# Patient Record
Sex: MALE | Race: BLACK OR AFRICAN AMERICAN | NOT HISPANIC OR LATINO | Employment: FULL TIME | URBAN - METROPOLITAN AREA
[De-identification: names, ages, dates, MRNs, and addresses within clinical notes are randomized per-mention and may not be internally consistent; named-entity substitution may affect disease eponyms.]

---

## 2017-02-21 ENCOUNTER — OFFICE VISIT (OUTPATIENT)
Dept: FAMILY MEDICINE | Facility: CLINIC | Age: 37
End: 2017-02-21
Payer: COMMERCIAL

## 2017-02-21 VITALS
HEIGHT: 68 IN | DIASTOLIC BLOOD PRESSURE: 76 MMHG | BODY MASS INDEX: 38.26 KG/M2 | WEIGHT: 252.44 LBS | HEART RATE: 69 BPM | SYSTOLIC BLOOD PRESSURE: 114 MMHG | OXYGEN SATURATION: 98 %

## 2017-02-21 DIAGNOSIS — R53.83 FATIGUE, UNSPECIFIED TYPE: ICD-10-CM

## 2017-02-21 DIAGNOSIS — R00.2 PALPITATIONS: ICD-10-CM

## 2017-02-21 DIAGNOSIS — R07.9 CHEST PAIN, UNSPECIFIED TYPE: Primary | ICD-10-CM

## 2017-02-21 PROCEDURE — 99999 PR PBB SHADOW E&M-EST. PATIENT-LVL III: CPT | Mod: PBBFAC,,, | Performed by: INTERNAL MEDICINE

## 2017-02-21 PROCEDURE — 93010 ELECTROCARDIOGRAM REPORT: CPT | Mod: S$GLB,,, | Performed by: INTERNAL MEDICINE

## 2017-02-21 PROCEDURE — 1160F RVW MEDS BY RX/DR IN RCRD: CPT | Mod: S$GLB,,, | Performed by: INTERNAL MEDICINE

## 2017-02-21 PROCEDURE — 93005 ELECTROCARDIOGRAM TRACING: CPT | Mod: S$GLB,,, | Performed by: INTERNAL MEDICINE

## 2017-02-21 PROCEDURE — 99214 OFFICE O/P EST MOD 30 MIN: CPT | Mod: S$GLB,,, | Performed by: INTERNAL MEDICINE

## 2017-02-21 RX ORDER — MUPIROCIN 20 MG/G
OINTMENT TOPICAL
Refills: 1 | COMMUNITY
Start: 2016-12-28 | End: 2017-04-17

## 2017-02-21 RX ORDER — SULFAMETHOXAZOLE AND TRIMETHOPRIM 800; 160 MG/1; MG/1
TABLET ORAL
Refills: 0 | COMMUNITY
Start: 2016-12-28 | End: 2017-03-21

## 2017-02-21 RX ORDER — HYDROCODONE BITARTRATE AND ACETAMINOPHEN 7.5; 325 MG/1; MG/1
TABLET ORAL
Refills: 0 | COMMUNITY
Start: 2016-12-28 | End: 2017-04-17

## 2017-02-21 NOTE — MR AVS SNAPSHOT
[facility]  [address]  [place] [address]  Phone: [phone]  Fax: [phone]                  Stanley Bancroft Harvey Jr.   2017 4:10 PM   Office Visit    Description:  Male : 1980   Provider:  Devante Oliver MD   Department:  [facility]           Reason for Visit     Fatigue     Shortness of Breath           Diagnoses this Visit        Comments    Chest pain, unspecified type    -  Primary     Palpitations         Fatigue, unspecified type                To Do List           Future Appointments        Provider Department Dept Phone    3/14/2017 8:15 AM LAB, [facility] - Lab [phone]    3/21/2017 6:50 PM Devante Oliver MD [facility] [phone]      Goals (5 Years of Data)     None      Follow-Up and Disposition     Return in about 4 weeks (around 3/21/2017).    Follow-up and Disposition History      [facility]     [facility] Care [facility] -  Assistance  Registered nurses in the [facility] provide clinical advisement, health education, appointment booking, and other advisory services.  Call for this free service at [phone].             Medications                Verify that the below list of medications is an accurate representation of the medications you are currently taking.  If none reported, the list may be blank. If incorrect, please contact your healthcare provider. Carry this list with you in case of emergency.           Current Medications     predniSONE (DELTASONE) 10 MG tablet Take 4 tabs daily for 3 days then Take 3 tabs daily for 3 days thenTake 2 tabs daily for 3 days then Take 1 tab daily for 3 days then stop    sulfamethoxazole-trimethoprim 800-160mg (BACTRIM DS) 800-160 mg Tab TK 1 T PO  BID    hydrocodone-acetaminophen 7.5-325mg (NORCO) 7.5-325 mg per tablet 1 Q 6 H PRF BREAKTHRU PAIN ONLY    mupirocin (BACTROBAN) 2 % ointment APPLY BID FOR 7 TO 10 DAYS     "sumatriptan (IMITREX) 50 MG tablet Take 1 tablet (50 mg total) by mouth every 2 (two) hours as needed for Migraine. may repeat x1 after 2h if HA recurs. Take no more than 2 tabs in 24 hour period.    tizanidine (ZANAFLEX) 2 MG tablet 1-2 pills at night as needed for neck pain           Clinical Reference Information           Your Vitals Were     BP Pulse Height Weight SpO2 BMI    114/76 69 5' 8" (1.727 m) 114.5 kg (252 lb 6.8 oz) 98% 38.38 kg/m2      Blood Pressure          Most Recent Value    BP  114/76      Allergies as of 2/21/2017     No Known Allergies      Immunizations Administered on Date of Encounter - 2/21/2017     None      Orders Placed During Today's Visit     Future Labs/Procedures Expected by Expires    CBC auto differential  2/21/2017 4/22/2018    Comprehensive metabolic panel  2/21/2017 4/22/2018    EKG 12-lead  2/21/2017 4/22/2018    Lipid panel  2/21/2017 4/22/2018    Testosterone  2/21/2017 4/22/2018    TSH  2/21/2017 4/22/2018      Smoking Cessation     If you would like to quit smoking:   You may be eligible for free services if you are a [place] resident and started smoking cigarettes before September 1, 1988.  Call the Smoking Cessation Trust ([facility]) toll free at (557) 780-6158 or (594) 505-0452.   Call 1-800-QUIT-NOW if you do not meet the above criteria.            Language Assistance Services     ATTENTION: Language assistance services are available, free of charge. Please call [phone].      ATENCIÓN: Si habla español, tiene a nagy disposición servicios gratuitos de asistencia lingüística. Llame al [phone].     CHÚ Ý: N?u b?n nói Ti?ng Vi?t, có các d?ch v? h? tr? ngôn ng? mi?n phí dành cho b?n. G?i s? [phone].         [facility] complies with applicable Federal civil rights laws and does not discriminate on the basis of race, color, national origin, age, disability, or sex.        "

## 2017-02-21 NOTE — PROGRESS NOTES
Subjective:       Patient ID: Stanley Bancroft Harvey Jr. is a 36 y.o. male.    Chief Complaint: Fatigue and Shortness of Breath    HPI Comments: Complains of intermittent mild - moderate chest pain off and on for the past 1 year.  Seems to have gotten worse over the past 1 mo.  It's worse with exertion and sometimes associated with palpitations.  He was moving furniture with his father-in-law Saturday and was very SOB going down the stairs.  His older father-in-law was not in comparison.   He has had to take unusual breaks at work lately related to exertional fatigue.  His chest is not painful to touch.  No early FH CAD, but he smokes.  He feels tired all the time lately as well.  His palpitations can occur at rest or exertion.     Fatigue   Associated symptoms include chest pain and fatigue. Pertinent negatives include no abdominal pain, arthralgias, fever, joint swelling, nausea, rash or vomiting.   Shortness of Breath   Associated symptoms include chest pain. Pertinent negatives include no abdominal pain, fever, rash or vomiting.     Review of Systems   Constitutional: Positive for fatigue. Negative for appetite change and fever.   HENT: Negative for nosebleeds and trouble swallowing.    Eyes: Negative for discharge and visual disturbance.   Respiratory: Positive for chest tightness. Negative for choking and shortness of breath.    Cardiovascular: Positive for chest pain and palpitations.   Gastrointestinal: Negative for abdominal pain, nausea and vomiting.   Musculoskeletal: Negative for arthralgias and joint swelling.   Skin: Negative for rash and wound.   Neurological: Negative for dizziness and syncope.   Psychiatric/Behavioral: Negative for confusion and dysphoric mood.       Objective:      Vitals:    02/21/17 1609   BP: 114/76   Pulse: 69     Physical Exam   Constitutional: He appears well-nourished.   Eyes: Conjunctivae and EOM are normal.   Neck: Normal range of motion.   Cardiovascular: Normal rate and  "regular rhythm.    Pulmonary/Chest: Effort normal and breath sounds normal.   Musculoskeletal:   Normal ROM bilateral    Neurological: No cranial nerve deficit (grossly intact).   Skin: Skin is warm and dry.   Psychiatric: He has a normal mood and affect.   Alert and orientated   Vitals reviewed.        Assessment:       1. Chest pain, unspecified type    2. Palpitations    3. Fatigue, unspecified type        Plan:       Chest pain, unspecified type  -     CBC auto differential; Future; Expected date: 2/21/17  -     Comprehensive metabolic panel; Future; Expected date: 2/21/17  -     Lipid panel; Future; Expected date: 2/21/17  -     TSH; Future; Expected date: 2/21/17  -     Testosterone; Future; Expected date: 2/21/17    Palpitations  -     CBC auto differential; Future; Expected date: 2/21/17  -     Comprehensive metabolic panel; Future; Expected date: 2/21/17  -     Lipid panel; Future; Expected date: 2/21/17  -     TSH; Future; Expected date: 2/21/17  -     Testosterone; Future; Expected date: 2/21/17  -     EKG 12-lead; Future; Expected date: 2/21/17    Fatigue, unspecified type  -     CBC auto differential; Future; Expected date: 2/21/17  -     Comprehensive metabolic panel; Future; Expected date: 2/21/17  -     Lipid panel; Future; Expected date: 2/21/17  -     TSH; Future; Expected date: 2/21/17  -     Testosterone; Future; Expected date: 2/21/17    EKG - intraventricular conduction delay; bradycardia; read by me        Counseled on regular exercise, maintenance of a healthy weight, balanced diet rich in fruits/vegetables and lean protein, and avoidance of unhealthy habits like smoking and excessive alcohol intake.   Also, counseled on importance of being compliant with medication, health appointments, diet and exercise.     Return in about 4 weeks (around 3/21/2017).    "This note will not be shared with the patient."  "

## 2017-03-14 ENCOUNTER — LAB VISIT (OUTPATIENT)
Dept: LAB | Facility: HOSPITAL | Age: 37
End: 2017-03-14
Attending: INTERNAL MEDICINE
Payer: COMMERCIAL

## 2017-03-14 DIAGNOSIS — R00.2 PALPITATIONS: ICD-10-CM

## 2017-03-14 DIAGNOSIS — R53.83 FATIGUE, UNSPECIFIED TYPE: ICD-10-CM

## 2017-03-14 DIAGNOSIS — R07.9 CHEST PAIN, UNSPECIFIED TYPE: ICD-10-CM

## 2017-03-14 LAB
ALBUMIN SERPL BCP-MCNC: 3.4 G/DL
ALP SERPL-CCNC: 79 U/L
ALT SERPL W/O P-5'-P-CCNC: 25 U/L
ANION GAP SERPL CALC-SCNC: 10 MMOL/L
AST SERPL-CCNC: 24 U/L
BASOPHILS # BLD AUTO: 0.02 K/UL
BASOPHILS NFR BLD: 0.4 %
BILIRUB SERPL-MCNC: 0.4 MG/DL
BUN SERPL-MCNC: 9 MG/DL
CALCIUM SERPL-MCNC: 9 MG/DL
CHLORIDE SERPL-SCNC: 105 MMOL/L
CHOLEST/HDLC SERPL: 4.2 {RATIO}
CO2 SERPL-SCNC: 25 MMOL/L
CREAT SERPL-MCNC: 1 MG/DL
DIFFERENTIAL METHOD: ABNORMAL
EOSINOPHIL # BLD AUTO: 0.1 K/UL
EOSINOPHIL NFR BLD: 1.8 %
ERYTHROCYTE [DISTWIDTH] IN BLOOD BY AUTOMATED COUNT: 13 %
EST. GFR  (AFRICAN AMERICAN): >60 ML/MIN/1.73 M^2
EST. GFR  (NON AFRICAN AMERICAN): >60 ML/MIN/1.73 M^2
GLUCOSE SERPL-MCNC: 107 MG/DL
HCT VFR BLD AUTO: 41.1 %
HDL/CHOLESTEROL RATIO: 23.7 %
HDLC SERPL-MCNC: 139 MG/DL
HDLC SERPL-MCNC: 33 MG/DL
HGB BLD-MCNC: 13.7 G/DL
LDLC SERPL CALC-MCNC: 87.6 MG/DL
LYMPHOCYTES # BLD AUTO: 1.5 K/UL
LYMPHOCYTES NFR BLD: 32.4 %
MCH RBC QN AUTO: 29.6 PG
MCHC RBC AUTO-ENTMCNC: 33.3 %
MCV RBC AUTO: 89 FL
MONOCYTES # BLD AUTO: 0.5 K/UL
MONOCYTES NFR BLD: 10.7 %
NEUTROPHILS # BLD AUTO: 2.4 K/UL
NEUTROPHILS NFR BLD: 54.5 %
NONHDLC SERPL-MCNC: 106 MG/DL
PLATELET # BLD AUTO: 302 K/UL
PMV BLD AUTO: 10.1 FL
POTASSIUM SERPL-SCNC: 4.1 MMOL/L
PROT SERPL-MCNC: 6.8 G/DL
RBC # BLD AUTO: 4.63 M/UL
SODIUM SERPL-SCNC: 140 MMOL/L
TESTOST SERPL-MCNC: 453 NG/DL
TRIGL SERPL-MCNC: 92 MG/DL
TSH SERPL DL<=0.005 MIU/L-ACNC: 1.3 UIU/ML
WBC # BLD AUTO: 4.47 K/UL

## 2017-03-14 PROCEDURE — 36415 COLL VENOUS BLD VENIPUNCTURE: CPT | Mod: PO

## 2017-03-14 PROCEDURE — 84443 ASSAY THYROID STIM HORMONE: CPT

## 2017-03-14 PROCEDURE — 80061 LIPID PANEL: CPT

## 2017-03-14 PROCEDURE — 85025 COMPLETE CBC W/AUTO DIFF WBC: CPT

## 2017-03-14 PROCEDURE — 80053 COMPREHEN METABOLIC PANEL: CPT

## 2017-03-14 PROCEDURE — 84403 ASSAY OF TOTAL TESTOSTERONE: CPT

## 2017-03-21 ENCOUNTER — OFFICE VISIT (OUTPATIENT)
Dept: FAMILY MEDICINE | Facility: CLINIC | Age: 37
End: 2017-03-21
Payer: COMMERCIAL

## 2017-03-21 VITALS
WEIGHT: 254.19 LBS | HEIGHT: 68 IN | OXYGEN SATURATION: 98 % | SYSTOLIC BLOOD PRESSURE: 120 MMHG | HEART RATE: 86 BPM | BODY MASS INDEX: 38.52 KG/M2 | DIASTOLIC BLOOD PRESSURE: 80 MMHG | RESPIRATION RATE: 16 BRPM

## 2017-03-21 DIAGNOSIS — G47.30 SLEEP APNEA, UNSPECIFIED TYPE: ICD-10-CM

## 2017-03-21 DIAGNOSIS — R53.83 FATIGUE, UNSPECIFIED TYPE: Primary | ICD-10-CM

## 2017-03-21 DIAGNOSIS — Z31.41 FERTILITY TESTING: ICD-10-CM

## 2017-03-21 PROCEDURE — 99999 PR PBB SHADOW E&M-EST. PATIENT-LVL IV: CPT | Mod: PBBFAC,,, | Performed by: INTERNAL MEDICINE

## 2017-03-21 PROCEDURE — 99214 OFFICE O/P EST MOD 30 MIN: CPT | Mod: S$GLB,,, | Performed by: INTERNAL MEDICINE

## 2017-03-21 PROCEDURE — 1160F RVW MEDS BY RX/DR IN RCRD: CPT | Mod: S$GLB,,, | Performed by: INTERNAL MEDICINE

## 2017-03-21 NOTE — MR AVS SNAPSHOT
[facility]  [address]  [place] [address]  Phone: [phone]  Fax: [phone]                  Stanley Bancroft Harvey Jr.   3/21/2017 6:50 PM   Office Visit    Description:  Male : 1980   Provider:  Devante Oliver MD   Department:  [facility]           Reason for Visit     Fatigue           Diagnoses this Visit        Comments    Fatigue, unspecified type    -  Primary     Sleep apnea, unspecified type         Fertility testing                To Do List           Future Appointments        Provider Department Dept Phone    2017 8:30 AM TERRENCE Salinas MD [facility] Urology [phone]      Goals (5 Years of Data)     None      Follow-Up and Disposition     Return if symptoms worsen or fail to improve.    Follow-up and Disposition History      [facility]     [facility] Care Line -  Assistance  Registered nurses in the [facility] Center provide clinical advisement, health education, appointment booking, and other advisory services.  Call for this free service at [phone].             Medications           STOP taking these medications     predniSONE (DELTASONE) 10 MG tablet Take 4 tabs daily for 3 days then Take 3 tabs daily for 3 days thenTake 2 tabs daily for 3 days then Take 1 tab daily for 3 days then stop    sulfamethoxazole-trimethoprim 800-160mg (BACTRIM DS) 800-160 mg Tab TK 1 T PO  BID    sumatriptan (IMITREX) 50 MG tablet Take 1 tablet (50 mg total) by mouth every 2 (two) hours as needed for Migraine. may repeat x1 after 2h if HA recurs. Take no more than 2 tabs in 24 hour period.    tizanidine (ZANAFLEX) 2 MG tablet 1-2 pills at night as needed for neck pain           Verify that the below list of medications is an accurate representation of the medications you are currently taking.  If none reported, the list may be blank. If incorrect, please contact your healthcare provider. Carry this list with you in  "case of emergency.           Current Medications     mupirocin (BACTROBAN) 2 % ointment APPLY BID FOR 7 TO 10 DAYS    hydrocodone-acetaminophen 7.5-325mg (NORCO) 7.5-325 mg per tablet 1 Q 6 H PRF BREAKTHRU PAIN ONLY           Clinical Reference Information           Your Vitals Were     BP Pulse Resp Height Weight SpO2    120/80 86 16 5' 8" (1.727 m) 115.3 kg (254 lb 3.1 oz) 98%    BMI                38.65 kg/m2          Blood Pressure          Most Recent Value    BP  120/80      Allergies as of 3/21/2017     No Known Allergies      Immunizations Administered on Date of Encounter - 3/21/2017     None      Orders Placed During Today's Visit      Normal Orders This Visit    Ambulatory consult for Sleep Study     Ambulatory consult to Urology       Smoking Cessation     If you would like to quit smoking:   You may be eligible for free services if you are a [place] resident and started smoking cigarettes before September 1, 1988.  Call the Smoking Cessation Trust ([facility]) toll free at (112) 796-1697 or [phone]) 294-9213.   Call 1-800-QUIT-NOW if you do not meet the above criteria.            Language Assistance Services     ATTENTION: Language assistance services are available, free of charge. Please call [phone].      ATENCIÓN: Si habla español, tiene a nagy disposición servicios gratuitos de asistencia lingüística. Llame al [phone].     CHÚ Ý: N?u b?n nói Ti?ng Vi?t, có các d?ch v? h? tr? ngôn ng? mi?n phí dành cho b?n. G?i s? [phone].         [facility] complies with applicable Federal civil rights laws and does not discriminate on the basis of race, color, national origin, age, disability, or sex.        "

## 2017-03-22 NOTE — PROGRESS NOTES
Subjective:       Patient ID: Stanley Bancroft Harvey Jr. is a 36 y.o. male.    Chief Complaint: Fatigue    HPI Comments: Fatigue - still present.  Does not wake rested.  Scared will fall asleep while driving < 20 min.  Wife witnessed apnea episodes while sleeping.  Labs wnl.    Quit smoking and caffeine - very high amounts previously.  No more DELA CRUZ, palpitations or chest pain.   Interested in fertility testing.  Discussed sleep issues and fertility at length.    Fatigue   Associated symptoms include fatigue. Pertinent negatives include no abdominal pain, arthralgias, chest pain, fever, joint swelling, nausea, rash or vomiting.     Review of Systems   Constitutional: Positive for fatigue. Negative for appetite change and fever.   HENT: Negative for nosebleeds and trouble swallowing.    Eyes: Negative for discharge and visual disturbance.   Respiratory: Negative for choking and shortness of breath.    Cardiovascular: Negative for chest pain and palpitations.   Gastrointestinal: Negative for abdominal pain, nausea and vomiting.   Musculoskeletal: Negative for arthralgias and joint swelling.   Skin: Negative for rash and wound.   Neurological: Negative for dizziness and syncope.   Psychiatric/Behavioral: Negative for confusion and dysphoric mood.       Objective:      Vitals:    03/21/17 1840   BP: 120/80   Pulse: 86   Resp: 16     Physical Exam   Constitutional: He appears well-nourished.   Eyes: Conjunctivae and EOM are normal.   Neck: Normal range of motion.   Cardiovascular: Normal rate and regular rhythm.    Pulmonary/Chest: Effort normal and breath sounds normal.   Musculoskeletal:   Normal ROM bilateral    Neurological: No cranial nerve deficit (grossly intact).   Skin: Skin is warm and dry.   Psychiatric: He has a normal mood and affect.   Alert and orientated   Vitals reviewed.        Assessment:       1. Fatigue, unspecified type    2. Sleep apnea, unspecified type    3. Fertility testing        Plan:      "  Fatigue, unspecified type  -     Ambulatory consult for Sleep Study    Sleep apnea, unspecified type  -     Ambulatory consult for Sleep Study    Fertility testing  -     Ambulatory consult to Urology    Total time spent with patient was more than 25 minutes with more than half the time spent in direct consultation with the patient in regards to our treatment/plan.          Counseled on regular exercise, maintenance of a healthy weight, balanced diet rich in fruits/vegetables and lean protein, and avoidance of unhealthy habits like smoking and excessive alcohol intake.   Also, counseled on importance of being compliant with medication, health appointments, diet and exercise.     Return if symptoms worsen or fail to improve.    "This note will not be shared with the patient."  "

## 2017-04-17 ENCOUNTER — OFFICE VISIT (OUTPATIENT)
Dept: UROLOGY | Facility: CLINIC | Age: 37
End: 2017-04-17
Payer: COMMERCIAL

## 2017-04-17 VITALS
SYSTOLIC BLOOD PRESSURE: 138 MMHG | HEART RATE: 66 BPM | WEIGHT: 251.31 LBS | DIASTOLIC BLOOD PRESSURE: 76 MMHG | HEIGHT: 68 IN | BODY MASS INDEX: 38.09 KG/M2

## 2017-04-17 DIAGNOSIS — N46.9 MALE INFERTILITY: Primary | ICD-10-CM

## 2017-04-17 LAB
BILIRUB SERPL-MCNC: NORMAL MG/DL
BLOOD URINE, POC: NORMAL
COLOR, POC UA: YELLOW
GLUCOSE UR QL STRIP: NORMAL
KETONES UR QL STRIP: NORMAL
LEUKOCYTE ESTERASE URINE, POC: NORMAL
NITRITE, POC UA: NORMAL
PH, POC UA: 5
PROTEIN, POC: NORMAL
SPECIFIC GRAVITY, POC UA: 1.02
UROBILINOGEN, POC UA: NORMAL

## 2017-04-17 PROCEDURE — 1160F RVW MEDS BY RX/DR IN RCRD: CPT | Mod: S$GLB,,, | Performed by: UROLOGY

## 2017-04-17 PROCEDURE — 81002 URINALYSIS NONAUTO W/O SCOPE: CPT | Mod: S$GLB,,, | Performed by: UROLOGY

## 2017-04-17 PROCEDURE — 99999 PR PBB SHADOW E&M-EST. PATIENT-LVL III: CPT | Mod: PBBFAC,,, | Performed by: UROLOGY

## 2017-04-17 PROCEDURE — 99203 OFFICE O/P NEW LOW 30 MIN: CPT | Mod: 25,S$GLB,, | Performed by: UROLOGY

## 2017-04-17 NOTE — PROGRESS NOTES
Subjective:       Patient ID: Stanley Bancroft Harvey Jr. is a 36 y.o. male.    Chief Complaint: Low Testosterone    HPI     36 year old here for fertility evaluation.  He is  for the last 8 months but he and his wife have been trying to conceive for over a year.  His wife had been using birth control pills in the past.  No prevous marriages for either.  Wife age 30.  No children from other relationships.  He has no voiding complaints.  No previous urinary tract infections.  He is overall healthy.  Takes no medications on a regular basis.   No previous surgery.  He denies ED.    Urine dipstick shows negative for all components.    History reviewed. No pertinent past medical history.    Past Surgical History:   Procedure Laterality Date    CIRCUMCISION       No current outpatient prescriptions on file.      Review of Systems   Constitutional: Negative for fever.   Eyes: Negative for visual disturbance.   Respiratory: Negative for shortness of breath.    Cardiovascular: Negative for chest pain.   Gastrointestinal: Negative for nausea.   Genitourinary: Negative for dysuria and hematuria.   Musculoskeletal: Negative for gait problem.   Skin: Negative for rash.   Neurological: Negative for seizures.   Psychiatric/Behavioral: Negative for confusion.       Objective:      Physical Exam   Constitutional: He is oriented to person, place, and time. He appears well-developed and well-nourished.   HENT:   Head: Normocephalic and atraumatic.   Eyes: Conjunctivae are normal.   Cardiovascular: Normal rate.    Pulmonary/Chest: Effort normal.   Abdominal: Hernia confirmed negative in the right inguinal area and confirmed negative in the left inguinal area.   Genitourinary: Penis normal. Rectal exam shows no mass and anal tone normal. Prostate is enlarged (30g s/s/a). Prostate is not tender.   Genitourinary Comments: Testes are small, atrophic   Musculoskeletal: Normal range of motion.   Lymphadenopathy: No inguinal  adenopathy noted on the right or left side.   Neurological: He is alert and oriented to person, place, and time.   Skin: Skin is warm and dry. No rash noted.   Psychiatric: He has a normal mood and affect.   Vitals reviewed.      Assessment:       1. Male infertility        Plan:       Male infertility  -     POCT URINE DIPSTICK WITHOUT MICROSCOPE      I recommend semen analysis.

## 2017-04-17 NOTE — LETTER
April 17, 2017      Devante Oliver MD  [address] [place] [address]           [place] [facility]  [address] [place] [address]  Phone: [phone]          Patient: Stanley Bancroft Harvey Jr.   MR Number: [id number]   YOB: 1980   Date of Visit: 4/17/2017       Dear Dr. Devante Oliver:    Thank you for referring Nabil Mora to me for evaluation. Attached you will find relevant portions of my assessment and plan of care.    If you have questions, please do not hesitate to call me. I look forward to following Nabil Mora along with you.    Sincerely,    TERRENCE Salinas MD    Enclosure  CC:  No Recipients    If you would like to receive this communication electronically, please contact [email or web address] or [phone] to request more information on [facility] access.    For providers and/or their staff who would like to refer a patient to [facility], please contact us through our one-stop-shop provider referral line, [facility] , at [phone].    If you feel you have received this communication in error or would no longer like to receive these types of communications, please e-mail [email or web address]

## 2018-08-22 ENCOUNTER — HOSPITAL ENCOUNTER (OUTPATIENT)
Dept: RADIOLOGY | Facility: HOSPITAL | Age: 38
Discharge: HOME | End: 2018-08-22
Attending: FAMILY MEDICINE
Payer: COMMERCIAL

## 2018-08-22 ENCOUNTER — OFFICE VISIT (OUTPATIENT)
Dept: FAMILY MEDICINE | Facility: CLINIC | Age: 38
End: 2018-08-22
Payer: COMMERCIAL

## 2018-08-22 VITALS
BODY MASS INDEX: 40.53 KG/M2 | OXYGEN SATURATION: 97 % | HEART RATE: 86 BPM | DIASTOLIC BLOOD PRESSURE: 84 MMHG | WEIGHT: 267.44 LBS | HEIGHT: 68 IN | SYSTOLIC BLOOD PRESSURE: 132 MMHG

## 2018-08-22 DIAGNOSIS — M25.511 RIGHT SHOULDER PAIN, UNSPECIFIED CHRONICITY: ICD-10-CM

## 2018-08-22 DIAGNOSIS — M54.2 CERVICALGIA: ICD-10-CM

## 2018-08-22 DIAGNOSIS — M54.2 CERVICALGIA: Primary | ICD-10-CM

## 2018-08-22 DIAGNOSIS — M79.18 ABDOMINAL MUSCLE PAIN: ICD-10-CM

## 2018-08-22 PROCEDURE — 99214 OFFICE O/P EST MOD 30 MIN: CPT | Mod: S$GLB,,, | Performed by: FAMILY MEDICINE

## 2018-08-22 PROCEDURE — 72040 X-RAY EXAM NECK SPINE 2-3 VW: CPT | Mod: TC,FY,PO

## 2018-08-22 PROCEDURE — 72040 X-RAY EXAM NECK SPINE 2-3 VW: CPT | Mod: 26,,, | Performed by: RADIOLOGY

## 2018-08-22 PROCEDURE — 73030 X-RAY EXAM OF SHOULDER: CPT | Mod: 26,RT,, | Performed by: RADIOLOGY

## 2018-08-22 PROCEDURE — 73030 X-RAY EXAM OF SHOULDER: CPT | Mod: TC,FY,PO,RT

## 2018-08-22 PROCEDURE — 3008F BODY MASS INDEX DOCD: CPT | Mod: CPTII,S$GLB,, | Performed by: FAMILY MEDICINE

## 2018-08-22 PROCEDURE — 99999 PR PBB SHADOW E&M-EST. PATIENT-LVL IV: CPT | Mod: PBBFAC,,, | Performed by: FAMILY MEDICINE

## 2018-08-22 RX ORDER — NAPROXEN 500 MG/1
500 TABLET ORAL 2 TIMES DAILY
Qty: 60 TABLET | Refills: 2 | Status: SHIPPED | OUTPATIENT
Start: 2018-08-22 | End: 2018-10-24

## 2018-08-22 NOTE — PROGRESS NOTES
Subjective:       Patient ID: Stanley Bancroft Harvey Jr. is a 38 y.o. male.    Chief Complaint: Annual Exam    HPI     Here for a check up.    Reports that he got hurt at work 2 months ago.  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  Since then, any weight training activity using core muscles caused pain in right lower quad area.  Went to [facility] ER June 27, 2018 due to worsening right lower quad abdominal pain.  Had ct of abdomen which was normal.  Was told by Er doctor that he had possible torn abdominal muscle or pulled muscle.  Was referred to workman's comp doctor and seeing him currently.  Here for a 2nd opinion.      C/o right shoulder pain x  2 1/2 months. Went to urgent care approx 2 1/2 months ago and had normal shoulder xray.  Occasional sharp pain radiating from shoulder to right hand.  Pain while sleeping.     Also, reports occasional right sided neck pain x 2-3 months.     Review of Systems   Constitutional: Negative for fever.   Gastrointestinal: Positive for abdominal pain. Negative for constipation, diarrhea, nausea and vomiting.   Genitourinary: Negative for dysuria, frequency and hematuria.   Musculoskeletal: Negative for arthralgias and myalgias.   Neurological: Negative for headaches.       Objective:      Physical Exam   HENT:   Head: Atraumatic.   Eyes: Conjunctivae are normal. Pupils are equal, round, and reactive to light.   Neck: Normal range of motion.   Cardiovascular: Normal rate and regular rhythm.   No murmur heard.  Pulmonary/Chest: Effort normal and breath sounds normal. He has no wheezes.   Abdominal: Soft. Bowel sounds are normal. There is no tenderness. There is no guarding. No hernia.   No pain of right mid to lower quad with crunch   Musculoskeletal:   Right shoulder: tend of posterolateral area.  Pain with abduction > 90 deg. Positive bean.     Lymphadenopathy:     He has no cervical adenopathy.       Assessment:       1.  Cervicalgia    2. Right shoulder pain, unspecified chronicity    3. Abdominal muscle pain        Plan:       Cervicalgia  -     X-Ray Cervical Spine AP And Lateral; Future; Expected date: 08/22/2018  -     X-Ray Shoulder Trauma 3 view Right; Future; Expected date: 08/22/2018  -     Ambulatory Referral to Physical/Occupational Therapy   -start naproxen    Right shoulder pain, unspecified chronicity  -     X-Ray Cervical Spine AP And Lateral; Future; Expected date: 08/22/2018  -     X-Ray Shoulder Trauma 3 view Right; Future; Expected date: 08/22/2018  -     Ambulatory Referral to Physical/Occupational Therapy   -start naproxen    Abdominal muscle pain   -Abdominal muscle pain resolving. Cont seeing workman's comp    Other orders  -     naproxen (NAPROSYN) 500 MG tablet; Take 1 tablet (500 mg total) by mouth 2 (two) times daily.  Dispense: 60 tablet; Refill: 2

## 2018-08-29 NOTE — PROGRESS NOTES
inform pt via phone that I reviewed the test results and note the following:    Xray of neck was normal  -xray of right shoulder showed some arthritis. Keep appt with physical therapy.

## 2018-09-04 NOTE — PROGRESS NOTES
[facility]  Physical Therapy Initial Evaluation    Name: Stanley Bancroft Harvey Jr.  Clinic Number: [id number]    Therapy Diagnosis:   Encounter Diagnoses   Name Primary?    Chronic right shoulder pain     Neck stiffness      Physician: Vaibhav Funk MD    Physician Orders: PT Eval and Treat Neck and R shoulder   Medical Diagnosis: M54.2 (ICD-10-CM) - Cervicalgia  M25.511 (ICD-10-CM) - Right shoulder pain, unspecified chronicity  Evaluation Date: 9/5/2018  Authorization period Expiration: 8/22/2019  Plan of Care Certification Period: 10/18/2018    Visit #: 1/ Visits authorized: 1  Time In:  0508  Time Out: 0556  Total Billable Time: 48 minutes    Precautions: Standard    Subjective   Date of onset:  Chronic pain for 20 years  History of current condition - Hernesto reports that he has had chronic R shoulder pain since he was a teenager.  Patient reports an exacerbation of the symptoms following an injury suffered at the gym ~3 months ago involving a chest press machine.  Patient then noticed that he had decreased ROM and swelling in the R shoulder and clavicle and then ultimately saw the MD.  MD prescribed anti-inflammatories which he has not taken and has not taken any OTC medications either.         No past medical history on file.  Stanley Bancroft Harvey Jr.  has a past surgical history that includes Circumcision.    Nabil has a current medication list which includes the following prescription(s): naproxen.    Review of patient's allergies indicates:  No Known Allergies     Imaging, x-ray: cervical (unremarkable), shoulder (evidence of prior dislocation, bone spurs present)    Prior Therapy: none  Social History: Patient lives with their spouse in a 1 story home with no stairs to enter.    Occupation:  (currently not working secondary to torn abdominal muscle)  Prior Level of Function:  (I) in all ADLs prior to recent exacerbation of shoulder   Current Level of Function:   Avoidance of reaching/lifting overhead, difficulty with dressing/undressing upper body, difficulty reaching behind back, limitations in ability to reach all body parts while bathing with the R arm    Pain:  Current 2/10, worst 9/10, best 2/10   Location: neck  and shoulder  right  Description: Burning and tired   Aggravating Factors: Sitting, Lifting and reaching overhead, prolonged work overhead, reaching behind back, 1 sleep disturbance per week  Easing Factors: hot bath    Pts goals: Patient wants to get rid of the pain in his arm.        Objective     Observation: Unremarkable    Posture: Forward head, rounded shoulders    Cervical Range of Motion:    Degrees Pain   Flexion 30 no     Extension 64 no     Right Rotation 75 no   Left Rotation 60 no     Right Side Bending 46 yes   Left Side Bending 36  yes      Passive Range of Motion:   Shoulder Left Right   Flexion n/a 150 p!   Abduction 165 p! 150 p!   Extension N/a  n/a   ER at 90 n/a n/a   IR n/a n/a      Active Range of Motion:   Shoulder Left Right   Flexion  p!   Abduction 160 146 p!   Extension WFL WFL   ER at 90 WFL WFL   IR WFL WFL tightness   Functional IR T8 T8 p!   Functional ER C4 C4         Upper Extremity Strength  (R) UE  (L) UE    Shoulder flexion: 4/5  p! Shoulder flexion: 5/5   Shoulder Abduction: 4-/5  p! Shoulder abduction: 5/5   Shoulder ER 4-/5 p! Shoulder ER 5/5   Shoulder IR 4/5 Shoulder IR 5/5   Shoulder extension 4+/5 Elbow flexion: 5/5         Special Tests:  AC Joint Left Rigth   AC Joint Compression Test (-) (-)   Empty Can Test (-) (+) p!   Drop Arm test (-) (-)   Speed's test (-) (+) p!   Anterior Apprehension test (-) (-)         Joint Mobility: general GH joint stiffness noted in R shoulder        Sensation: Patient reports numbness and tingling in the R arm in the AM but none at this moment        CMS Impairment/Limitation/Restriction for FOTO Intake Survey    Therapist reviewed FOTO scores for Stanley Bancroft Harvey Jr.  on 9/5/2018.   FOTO documents entered into [employer] - see Media section.    Limitation Score: 49%  Category: Body Position    Current : CK = at least 40% but < 60% impaired, limited or restricted  Goal: CI = at least 1% but < 20% impaired, limited or restricted       PT Evaluation Completed? Yes  Discussed Plan of Care with patient: Yes    TREATMENT   Treatment Time In: 0508  Treatment Time Out: 0556  Total Treatment time separate from Evaluation time:16    Hernesto received therapeutic exercises to develop strength, endurance, ROM and flexibility for 16 minutes including:  Cane flexion 20 x   Cane abduction 20x  Bilateral shoulder extension BTB 3 x 10  Rows BTB 3 x 10      Home Exercises and Patient Education Provided    Education provided re:   - progress towards goals   - role of therapy in multi - disciplinary team, goals for therapy  No spiritual or educational barriers to learning provided    Written Home Exercises Provided: see scanned document in EMR.  Exercises were reviewed and Hernesto was able to demonstrate them prior to the end of the session.   Pt received a written copy of exercises to perform at home. Hernesto demonstrated good  understanding of the education provided.       Assessment     Nabil is a 38 y.o. male referred to outpatient Physical Therapy with a medical diagnosis of cervicalgia, right shoulder pain, unspecified chronicity. Pt presents with limited neck and R shoulder mobility as well as weakness of the RUE.  Functionally, Nabil has difficulty with reaching behind back, working overhead, and intermittently has difficulty with sleeping.    Pt prognosis is Good.   Pt will benefit from skilled outpatient Physical Therapy to address the deficits stated above and in the chart below, provide pt/family education, and to maximize pt's level of independence.     Plan of care discussed with patient: Yes  Pt's spiritual, cultural and educational needs considered and pt agreeable to plan of care and goals as  stated below:     Anticipated Barriers for therapy: none    Medical Necessity is demonstrated by the following  History  Co-morbidities and personal factors that may impact the plan of care Examination  Body Structures and Functions, activity limitations and participation restrictions that may impact the plan of care    Clinical Presentation   Co-morbidities:   none        Personal Factors:   no deficits Body Regions:   neck  upper extremities    Body Systems:    gross symmetry  ROM  strength  gross coordinated movement            Participation Restrictions:   none     Activity limitations:   Learning and applying knowledge  no deficits    General Tasks and Commands  no deficits    Communication  no deficits    Mobility  lifting and carrying objects    Self care  no deficits    Domestic Life  doing house work (cleaning house, washing dishes, laundry)    Interactions/Relationships  no deficits    Life Areas  employment    Community and Social Life  no deficits         stable and uncomplicated                      low   low  low Decision Making/ Complexity Score:  low       GOALS: Short Term Goals: 3 weeks  1.  Nabil will be independent in HEP.  2. Nabil will report 4/10 pain at worst with working overhead.  3. Nabil will demonstrate increased UE strength to 4/5 for increased ease with lifting/carrying.  4.  Nabil will demonstrate a 50% improvement in R shoulder motion to improved ability to reach overhead and behind his back.      Long Term Goals: 6 weeks  1.  Nabil will report 1-2/10 pain at worst with sitting.  2.Nabil will demonstrate increased UE strength to 5/5 for increased ease with work related activities.  3.  Nabil will demonstrate WFL R shoulder mobility to improve ability to reach behind back.  4.  Pt will report at CI level (1-20% impaired) on FOTO UE survey score for pain disability to demonstrate decrease in disability and improvement in UE pain.        Plan     Certification Period:  9/5/2018 to 10/18/2018.    Outpatient Physical Therapy 2 times weekly for 6 weeks to include the following interventions: Electrical Stimulation TENS, Gait Training, Manual Therapy, Moist Heat/ Ice, Neuromuscular Re-ed, Patient Education, Therapeutic Activites and Therapeutic Exercise.     Mai Estrada, PT, DPT

## 2018-09-05 ENCOUNTER — CLINICAL SUPPORT (OUTPATIENT)
Dept: REHABILITATION | Facility: HOSPITAL | Age: 38
End: 2018-09-05
Attending: FAMILY MEDICINE
Payer: COMMERCIAL

## 2018-09-05 DIAGNOSIS — M43.6 NECK STIFFNESS: ICD-10-CM

## 2018-09-05 DIAGNOSIS — G89.29 CHRONIC RIGHT SHOULDER PAIN: ICD-10-CM

## 2018-09-05 DIAGNOSIS — M25.511 CHRONIC RIGHT SHOULDER PAIN: ICD-10-CM

## 2018-09-05 PROCEDURE — 97110 THERAPEUTIC EXERCISES: CPT | Mod: PO

## 2018-09-05 PROCEDURE — 97161 PT EVAL LOW COMPLEX 20 MIN: CPT | Mod: PO

## 2018-09-06 ENCOUNTER — PATIENT MESSAGE (OUTPATIENT)
Dept: FAMILY MEDICINE | Facility: CLINIC | Age: 38
End: 2018-09-06

## 2018-09-06 PROBLEM — M43.6 NECK STIFFNESS: Status: ACTIVE | Noted: 2018-09-06

## 2018-09-06 PROBLEM — G89.29 CHRONIC RIGHT SHOULDER PAIN: Status: ACTIVE | Noted: 2018-09-06

## 2018-09-06 PROBLEM — M25.511 CHRONIC RIGHT SHOULDER PAIN: Status: ACTIVE | Noted: 2018-09-06

## 2018-09-06 NOTE — PLAN OF CARE
[facility]  Physical Therapy Initial Evaluation    Name: Stanley Bancroft Harvey Jr.  Clinic Number: [id number]    Therapy Diagnosis:   Encounter Diagnoses   Name Primary?    Chronic right shoulder pain     Neck stiffness      Physician: Vaibhav Funk MD    Physician Orders: PT Eval and Treat Neck and R shoulder   Medical Diagnosis: M54.2 (ICD-10-CM) - Cervicalgia  M25.511 (ICD-10-CM) - Right shoulder pain, unspecified chronicity  Evaluation Date: 9/5/2018  Authorization period Expiration: 8/22/2019  Plan of Care Certification Period: 10/18/2018    Visit #: 1/ Visits authorized: 1  Time In:  0508  Time Out: 0556  Total Billable Time: 48 minutes    Precautions: Standard    Subjective   Date of onset:  Chronic pain for 20 years  History of current condition - Hernesto reports that he has had chronic R shoulder pain since he was a teenager.  Patient reports an exacerbation of the symptoms following an injury suffered at the gym ~3 months ago involving a chest press machine.  Patient then noticed that he had decreased ROM and swelling in the R shoulder and clavicle and then ultimately saw the MD.  MD prescribed anti-inflammatories which he has not taken and has not taken any OTC medications either.         No past medical history on file.  Stanley Bancroft Harvey Jr.  has a past surgical history that includes Circumcision.    Nabil has a current medication list which includes the following prescription(s): naproxen.    Review of patient's allergies indicates:  No Known Allergies     Imaging, x-ray: cervical (unremarkable), shoulder (evidence of prior dislocation, bone spurs present)    Prior Therapy: none  Social History: Patient lives with their spouse in a 1 story home with no stairs to enter.    Occupation:  (currently not working secondary to torn abdominal muscle)  Prior Level of Function:  (I) in all ADLs prior to recent exacerbation of shoulder   Current Level of Function:   Avoidance of reaching/lifting overhead, difficulty with dressing/undressing upper body, difficulty reaching behind back, limitations in ability to reach all body parts while bathing with the R arm    Pain:  Current 2/10, worst 9/10, best 2/10   Location: neck  and shoulder  right  Description: Burning and tired   Aggravating Factors: Sitting, Lifting and reaching overhead, prolonged work overhead, reaching behind back, 1 sleep disturbance per week  Easing Factors: hot bath    Pts goals: Patient wants to get rid of the pain in his arm.        Objective     Observation: Unremarkable    Posture: Forward head, rounded shoulders    Cervical Range of Motion:    Degrees Pain   Flexion 30 no     Extension 64 no     Right Rotation 75 no   Left Rotation 60 no     Right Side Bending 46 yes   Left Side Bending 36  yes      Passive Range of Motion:   Shoulder Left Right   Flexion n/a 150 p!   Abduction 165 p! 150 p!   Extension N/a  n/a   ER at 90 n/a n/a   IR n/a n/a      Active Range of Motion:   Shoulder Left Right   Flexion  p!   Abduction 160 146 p!   Extension WFL WFL   ER at 90 WFL WFL   IR WFL WFL tightness   Functional IR T8 T8 p!   Functional ER C4 C4         Upper Extremity Strength  (R) UE  (L) UE    Shoulder flexion: 4/5  p! Shoulder flexion: 5/5   Shoulder Abduction: 4-/5  p! Shoulder abduction: 5/5   Shoulder ER 4-/5 p! Shoulder ER 5/5   Shoulder IR 4/5 Shoulder IR 5/5   Shoulder extension 4+/5 Elbow flexion: 5/5         Special Tests:  AC Joint Left Rigth   AC Joint Compression Test (-) (-)   Empty Can Test (-) (+) p!   Drop Arm test (-) (-)   Speed's test (-) (+) p!   Anterior Apprehension test (-) (-)         Joint Mobility: general GH joint stiffness noted in R shoulder        Sensation: Patient reports numbness and tingling in the R arm in the AM but none at this moment        CMS Impairment/Limitation/Restriction for FOTO Intake Survey    Therapist reviewed FOTO scores for Stanley Bancroft Harvey Jr.  on 9/5/2018.   FOTO documents entered into [email or web address] - see Media section.    Limitation Score: 49%  Category: Body Position    Current : CK = at least 40% but < 60% impaired, limited or restricted  Goal: CI = at least 1% but < 20% impaired, limited or restricted       PT Evaluation Completed? Yes  Discussed Plan of Care with patient: Yes    TREATMENT   Treatment Time In: 0508  Treatment Time Out: 0556  Total Treatment time separate from Evaluation time:16    Hernesto received therapeutic exercises to develop strength, endurance, ROM and flexibility for 16 minutes including:  Cane flexion 20 x   Cane abduction 20x  Bilateral shoulder extension BTB 3 x 10  Rows BTB 3 x 10      Home Exercises and Patient Education Provided    Education provided re:   - progress towards goals   - role of therapy in multi - disciplinary team, goals for therapy  No spiritual or educational barriers to learning provided    Written Home Exercises Provided: see scanned document in EMR.  Exercises were reviewed and Hernesto was able to demonstrate them prior to the end of the session.   Pt received a written copy of exercises to perform at home. Hernesto demonstrated good  understanding of the education provided.       Assessment     Nabil is a 38 y.o. male referred to outpatient Physical Therapy with a medical diagnosis of cervicalgia, right shoulder pain, unspecified chronicity. Pt presents with limited neck and R shoulder mobility as well as weakness of the RUE.  Functionally, Nabil has difficulty with reaching behind back, working overhead, and intermittently has difficulty with sleeping.    Pt prognosis is Good.   Pt will benefit from skilled outpatient Physical Therapy to address the deficits stated above and in the chart below, provide pt/family education, and to maximize pt's level of independence.     Plan of care discussed with patient: Yes  Pt's spiritual, cultural and educational needs considered and pt agreeable to plan of care and goals as  stated below:     Anticipated Barriers for therapy: none    Medical Necessity is demonstrated by the following  History  Co-morbidities and personal factors that may impact the plan of care Examination  Body Structures and Functions, activity limitations and participation restrictions that may impact the plan of care    Clinical Presentation   Co-morbidities:   none        Personal Factors:   no deficits Body Regions:   neck  upper extremities    Body Systems:    gross symmetry  ROM  strength  gross coordinated movement            Participation Restrictions:   none     Activity limitations:   Learning and applying knowledge  no deficits    General Tasks and Commands  no deficits    Communication  no deficits    Mobility  lifting and carrying objects    Self care  no deficits    Domestic Life  doing house work (cleaning house, washing dishes, laundry)    Interactions/Relationships  no deficits    Life Areas  employment    Community and Social Life  no deficits         stable and uncomplicated                      low   low  low Decision Making/ Complexity Score:  low       GOALS: Short Term Goals: 3 weeks  1.  Nabil will be independent in HEP.  2. Nabil will report 4/10 pain at worst with working overhead.  3. Nabil will demonstrate increased UE strength to 4/5 for increased ease with lifting/carrying.  4.  Nabil will demonstrate a 50% improvement in R shoulder motion to improved ability to reach overhead and behind his back.      Long Term Goals: 6 weeks  1.  Nabil will report 1-2/10 pain at worst with sitting.  2.Nabil will demonstrate increased UE strength to 5/5 for increased ease with work related activities.  3.  Nabil will demonstrate WFL R shoulder mobility to improve ability to reach behind back.  4.  Pt will report at CI level (1-20% impaired) on FOTO UE survey score for pain disability to demonstrate decrease in disability and improvement in UE pain.        Plan     Certification Period:  9/5/2018 to 10/18/2018.    Outpatient Physical Therapy 2 times weekly for 6 weeks to include the following interventions: Electrical Stimulation TENS, Gait Training, Manual Therapy, Moist Heat/ Ice, Neuromuscular Re-ed, Patient Education, Therapeutic Activites and Therapeutic Exercise.     Mai Estrada, PT, DPT

## 2018-09-11 ENCOUNTER — CLINICAL SUPPORT (OUTPATIENT)
Dept: REHABILITATION | Facility: HOSPITAL | Age: 38
End: 2018-09-11
Attending: FAMILY MEDICINE
Payer: COMMERCIAL

## 2018-09-11 DIAGNOSIS — M25.511 CHRONIC RIGHT SHOULDER PAIN: Primary | ICD-10-CM

## 2018-09-11 DIAGNOSIS — M43.6 NECK STIFFNESS: ICD-10-CM

## 2018-09-11 DIAGNOSIS — G89.29 CHRONIC RIGHT SHOULDER PAIN: Primary | ICD-10-CM

## 2018-09-11 PROCEDURE — 97110 THERAPEUTIC EXERCISES: CPT | Mod: PO

## 2018-09-11 NOTE — PROGRESS NOTES
"  Physical Therapy Daily Treatment Note     Name: Stanley Bancroft Harvey Jr.  Clinic Number: [id number]    Therapy Diagnosis:   Encounter Diagnoses   Name Primary?    Chronic right shoulder pain Yes    Neck stiffness      Physician: Vaibhav Funk MD    Visit Date: 9/11/2018       Medical Diagnosis: M54.2 (ICD-10-CM) - Cervicalgia  M25.511 (ICD-10-CM) - Right shoulder pain, unspecified chronicity  Evaluation Date: 9/5/2018  Authorization period Expiration: 8/22/2019  Plan of Care Certification Period: 10/18/2018   Visit #: 2/ Visits authorized: 30  Time In: 10:00  Time Out: 11:05  Treatment time: 55      Precautions: Standard    Subjective     Pt reports: chronic (R) shoulder pain.   States it is challenging working as a ." I want to cut my shoulder off a times." States that he has a high tolerance for pain.  Reports min neck pain if sitting still for too long.   Pain: 8/10 to (R) shoulder      Objective     Hernesto received therapeutic exercises to develop UE /cervical  strength, endurance, ROM, flexibility and posture for 45 minutes including:  doorway pec stretch 2 x 30 sec  AAROM on pulleys for shoulder flex and abd x 2 minutes each.   Bilateral shoulder extension BTB 3 x 10  Rows BTB 3 x 10  Shoulder ER/IR iso walk out with OTB x 10 each  Serratus wall slides 2 x 10  Scaption to 90 degrees 2 x 10  AAROM shoulder flex with dowel in supine  x 10  Supine scap protract 2 x 10   Sidelying ER 2 x 10  Sidelying H- abd 2 x 10    Hernesto received the following manual therapy techniques: Joint mobilizations were applied to the: (R) shoulder  for 10 minutes, including:  GH oscillations  Inferior/posterior glides grade ll-lll  STM pec insertion    Patient receives a cold pack applied to (R) shoulder x 10 minutes for pain control     Written Home Exercises Provided: Patient educated to continue with ex's provided on eval along with updated HEP to include: doorway pec strech, serratus wall slides, ER/IR walkouts with " OTB, Sidelying ER, Scap protract. He was advised to avoid exacerbation of pain with ex's.   Exercises were reviewed and Hernesto was able to demonstrate them prior to the end of the session.  Hernesto demonstrated good  understanding of the education provided.        Assessment     Patient antoinette Tx fairly well. He was able to perform the above ex's/activities within tolerable level of muscular fatigue and without increased pain symptoms. Requires cues for postural awareness and scap engagement with ex's/activities. Pec tightness evident. Patient reports improved pain control and flexibility post session reporting pain = 3/10. Will monitor and attempt to progress as tolerated.  .     Pt will continue to benefit from skilled outpatient physical therapy to address the deficits listed in the problem list box on initial evaluation, provide pt/family education and to maximize pt's level of independence in the home and community environment.          GOALS: Short Term Goals:    1.  Nabil will be independent in HEP.  2. Nabil will report 4/10 pain at worst with working overhead.  3. Nabil will demonstrate increased UE strength to 4/5 for increased ease with lifting/carrying.  4.  Nabil will demonstrate a 50% improvement in R shoulder motion to improved ability to reach overhead and behind his back.        Long Term Goals:    1.  Nabil will report 1-2/10 pain at worst with sitting.  2.Nabil will demonstrate increased UE strength to 5/5 for increased ease with work related activities.  3.  Nabil will demonstrate WFL R shoulder mobility to improve ability to reach behind back.  4.  Pt will report at CI level (1-20% impaired) on FOTO UE survey score for pain disability to demonstrate decrease in disability and improvement in UE pain.    Plan     Continue PT towards established plan of care and goals.     Rahul Lopez, PTA

## 2018-09-14 ENCOUNTER — CLINICAL SUPPORT (OUTPATIENT)
Dept: REHABILITATION | Facility: HOSPITAL | Age: 38
End: 2018-09-14
Attending: FAMILY MEDICINE
Payer: COMMERCIAL

## 2018-09-14 DIAGNOSIS — M43.6 NECK STIFFNESS: ICD-10-CM

## 2018-09-14 DIAGNOSIS — M25.511 CHRONIC RIGHT SHOULDER PAIN: ICD-10-CM

## 2018-09-14 DIAGNOSIS — G89.29 CHRONIC RIGHT SHOULDER PAIN: ICD-10-CM

## 2018-09-14 PROCEDURE — 97110 THERAPEUTIC EXERCISES: CPT | Mod: PO

## 2018-09-14 NOTE — PROGRESS NOTES
Physical Therapy Daily Treatment Note     Name: Stanley Bancroft Harvey Jr.  Clinic Number: [id number]    Therapy Diagnosis:   Encounter Diagnoses   Name Primary?    Chronic right shoulder pain     Neck stiffness      Physician: Vaibhav Funk MD    Visit Date: 9/14/2018       Medical Diagnosis: M54.2 (ICD-10-CM) - Cervicalgia  M25.511 (ICD-10-CM) - Right shoulder pain, unspecified chronicity  Evaluation Date: 9/5/2018  Authorization period Expiration: 8/22/2019  Plan of Care Certification Period: 10/18/2018   Visit #: 3/ Visits authorized: 30  Time In: 11:00  Time Out:  12:00  Treatment time: 50     Precautions: Standard    Subjective     Pt reports: chronic (R) shoulder pain.  He reports no problems after last session however, he does report some increased soreness to (R) shoulder today due to returning to work (  duties) for the first time yesterday which involved some lifting.   Pain: 5-6/10 to (R) shoulder      Objective     Hernesto received therapeutic exercises to develop UE /cervical  strength, endurance, ROM, flexibility and posture for 50 minutes including:  Corner pec stretch 2 x 30 sec  AAROM on pulleys for shoulder flex and abd x 2 minutes each.   Bilateral shoulder extension BTB 3 x 10  Rows BTB 3 x 10  Shoulder ER/IR iso walk out with OTB x 10 each  Serratus wall slides with bolster 2 x 10  Scaption to 90 degrees  With 1# 2 x 10  Closed chain scap stabilization performing ABC's on wall  AAROM shoulder flex with dowel in supine with 3# dowel x 10  Supine scap protract with 2# 2 x 10   Sidelying ER with 1# 2 x 10  Sidelying H- abd with 1# 2 x 10    Hernesto received the following manual therapy techniques: Joint mobilizations were applied to the: (R) shoulder  for 10 minutes, including: ( NP today)   GH oscillations  Inferior/posterior glides grade ll-lll  STM pec insertion    Patient receives a cold pack applied to (R) shoulder x 10 minutes for pain control--NP patient declines     Written Home  Exercises Provided: Patient educated to continue with previously issued ex's to tolerance.     Exercises were reviewed and Hernesto was able to demonstrate them prior to the end of the session.  Hernesto demonstrated good  understanding of the education provided.        Assessment      Patient antoinette Tx fairly well. He was able to perform and progress slightly with the above ex's/activities within tolerable level of muscular fatigue and without increased pain symptoms. Still requires cues for postural awareness and scap engagement with ex's/activities. Pec tightness evident. Patient reports improved pain control and flexibility post session reporting pain = 3-4/10. Will monitor and attempt to progress as tolerated.  .     Pt will continue to benefit from skilled outpatient physical therapy to address the deficits listed in the problem list box on initial evaluation, provide pt/family education and to maximize pt's level of independence in the home and community environment.          GOALS: Short Term Goals:    1.  Nabil will be independent in HEP.  2. Nabil will report 4/10 pain at worst with working overhead.  3. Nabil will demonstrate increased UE strength to 4/5 for increased ease with lifting/carrying.  4.  Nabil will demonstrate a 50% improvement in R shoulder motion to improved ability to reach overhead and behind his back.        Long Term Goals:    1.  Nabil will report 1-2/10 pain at worst with sitting.  2.Nabil will demonstrate increased UE strength to 5/5 for increased ease with work related activities.  3.  Nabil will demonstrate WFL R shoulder mobility to improve ability to reach behind back.  4.  Pt will report at CI level (1-20% impaired) on FOTO UE survey score for pain disability to demonstrate decrease in disability and improvement in UE pain.    Plan     Continue PT towards established plan of care and goals.     Rahul Lopez, PTA

## 2018-09-18 ENCOUNTER — CLINICAL SUPPORT (OUTPATIENT)
Dept: REHABILITATION | Facility: HOSPITAL | Age: 38
End: 2018-09-18
Attending: FAMILY MEDICINE
Payer: COMMERCIAL

## 2018-09-18 DIAGNOSIS — M43.6 NECK STIFFNESS: ICD-10-CM

## 2018-09-18 DIAGNOSIS — G89.29 CHRONIC RIGHT SHOULDER PAIN: ICD-10-CM

## 2018-09-18 DIAGNOSIS — M25.511 CHRONIC RIGHT SHOULDER PAIN: ICD-10-CM

## 2018-09-18 PROCEDURE — 97110 THERAPEUTIC EXERCISES: CPT | Mod: PO

## 2018-09-18 NOTE — PROGRESS NOTES
Physical Therapy Daily Treatment Note     Name: Stanley Bancroft Harvey Jr.  Clinic Number: [id number]    Therapy Diagnosis:   Encounter Diagnoses   Name Primary?    Chronic right shoulder pain     Neck stiffness      Physician: Vaibhav Funk MD    Visit Date: 9/18/2018       Medical Diagnosis: M54.2 (ICD-10-CM) - Cervicalgia  M25.511 (ICD-10-CM) - Right shoulder pain, unspecified chronicity  Evaluation Date: 9/5/2018  Authorization period Expiration: 8/22/2019  Plan of Care Certification Period: 10/18/2018   Visit #: 4/ Visits authorized: 30  Time In: 0500  Time Out:  0558  Treatment time: 58     Precautions: Standard    Subjective     Pt reports: that he felt really good Saturday, Sunday, and Monday but woke up with a little bit of pain this morning (3/10) and thinks he may have slept wrong on the shoulder.  Patient denies any pain upon entering PT despite being at work earlier.   Pain: 0/10 to (R) shoulder      Objective     Hernesto received therapeutic exercises to develop UE /cervical  strength, endurance, ROM, flexibility and posture for 58 minutes including:    UBE 2'fwd/2'bwd for warmup  Corner pec stretch 3 x 30 sec  AAROM on pulleys for shoulder flex and abd x 2 minutes each.   Bilateral shoulder extension BTB 3 x 10  Rows BTB 3 x 10  Shoulder ER/IR iso walk out with OTB x 10 each  Serratus wall slides with bolster 2 x 10  Scaption to 90 degrees  With 1# 2 x 10  Closed chain scap stabilization performing ABC's on wall x 2  AAROM shoulder flex with dowel in supine with 3# dowel x 10  Supine scap protract with 2# 2 x 10   Sidelying ER with 1# 2 x 10  Sidelying H- abd with 1# 2 x 10  Supine pec stretch over 1/2 foam roll 3'    Hernesto received the following manual therapy techniques: Joint mobilizations were applied to the: (R) shoulder  for 10 minutes, including: ( NP today)   GH oscillations  Inferior/posterior glides grade ll-lll  STM pec insertion    Patient receives a cold pack applied to (R) shoulder x  10 minutes for pain control--NP patient declines     Written Home Exercises Provided: Patient educated to continue with previously issued ex's to tolerance.     Exercises were reviewed and Hernesto was able to demonstrate them prior to the end of the session.  Hernesto demonstrated good  understanding of the education provided.        Assessment      Patient tolerated treatment session well but reports generalized fatigue following the entire session.  Patient denied any increased pain following session.  Patient required intermittent verbal and visual cues for correct performance of exercises.  Improved pec flexibility noted following initiation of supine pec stretch.      Pt will continue to benefit from skilled outpatient physical therapy to address the deficits listed in the problem list box on initial evaluation, provide pt/family education and to maximize pt's level of independence in the home and community environment.          GOALS: Short Term Goals:    1.  Nabil will be independent in HEP.  2. Nabil will report 4/10 pain at worst with working overhead.  3. Nabil will demonstrate increased UE strength to 4/5 for increased ease with lifting/carrying.  4.  Nabil will demonstrate a 50% improvement in R shoulder motion to improved ability to reach overhead and behind his back.        Long Term Goals:    1.  Nabil will report 1-2/10 pain at worst with sitting.  2.Nabil will demonstrate increased UE strength to 5/5 for increased ease with work related activities.  3.  Nabil will demonstrate WFL R shoulder mobility to improve ability to reach behind back.  4.  Pt will report at CI level (1-20% impaired) on FOTO UE survey score for pain disability to demonstrate decrease in disability and improvement in UE pain.    Plan     Continue PT towards established plan of care and goals.     Mai Estrada, PT

## 2018-09-20 ENCOUNTER — CLINICAL SUPPORT (OUTPATIENT)
Dept: REHABILITATION | Facility: HOSPITAL | Age: 38
End: 2018-09-20
Attending: FAMILY MEDICINE
Payer: COMMERCIAL

## 2018-09-20 DIAGNOSIS — M43.6 NECK STIFFNESS: ICD-10-CM

## 2018-09-20 DIAGNOSIS — M25.511 CHRONIC RIGHT SHOULDER PAIN: ICD-10-CM

## 2018-09-20 DIAGNOSIS — G89.29 CHRONIC RIGHT SHOULDER PAIN: ICD-10-CM

## 2018-09-20 PROCEDURE — 97110 THERAPEUTIC EXERCISES: CPT | Mod: PO

## 2018-09-20 NOTE — PROGRESS NOTES
Physical Therapy Daily Treatment Note     Name: Stanley Bancroft Harvey Jr.  Clinic Number: [id number]    Therapy Diagnosis:   Encounter Diagnoses   Name Primary?    Chronic right shoulder pain     Neck stiffness      Physician: Vaibhav Funk MD    Visit Date: 9/20/2018       Medical Diagnosis: M54.2 (ICD-10-CM) - Cervicalgia  M25.511 (ICD-10-CM) - Right shoulder pain, unspecified chronicity  Evaluation Date: 9/5/2018  Authorization period Expiration: 8/22/2019  Plan of Care Certification Period: 10/18/2018   Visit #: 5/ Visits authorized: 30  Time In: 0702  Time Out:  0803  Treatment time: 61     Precautions: Standard    Subjective     Pt reports: that his shoulder woke him up this morning secondary to laying on it for too long.  Patient denies any pain currently.  Pain: 0/10 to (R) shoulder      Objective     Hernesto received therapeutic exercises to develop UE /cervical  strength, endurance, ROM, flexibility and posture for 61 minutes including:    UBE 2'fwd/2'bwd for warmup L1  Corner pec stretch 3 x 30 sec  AAROM on pulleys for shoulder flex and abd x 2 minutes each.   Bilateral shoulder extension BTB 3 x 10  Rows BTB 3 x 10  Shoulder ER/IR iso walk out with OTB x 10 each  Serratus wall slides with bolster 2 x 10  Scaption to 90 degrees  With 1# 2 x 10  Closed chain scap stabilization performing ABC's on wall x 2  AAROM shoulder flex with dowel in supine with 3# dowel x 10-NP  Supine scap protract with 2# 2 x 10 -NP  Sidelying ER with 1# 2 x 10-NP  Sidelying H- abd with 1# 2 x 10-NP  Supine pec stretch over 1/2 foam roll 3'  Prone extension on PB 2 x 10  Prone horizontal abduction on PB 2 x 10    Hernesto received the following manual therapy techniques: Joint mobilizations were applied to the: (R) shoulder  for 10 minutes, including: ( NP today)   GH oscillations  Inferior/posterior glides grade ll-lll  STM pec insertion    Patient receives a cold pack applied to (R) shoulder x 10 minutes for pain control--NP  patient declines     Written Home Exercises Provided: Patient educated to continue with previously issued ex's to tolerance.     Exercises were reviewed and Hernesto was able to demonstrate them prior to the end of the session.  Hernesto demonstrated good  understanding of the education provided.        Assessment      Patient demonstrated fatigue with prone activities today.  Patient required increased time to perform all exercises today.  Patient continues to have significant tightness in the pecs bilaterally that often effects form with exercises.  Verbal cueing required throughout to maintain proper form.       Pt will continue to benefit from skilled outpatient physical therapy to address the deficits listed in the problem list box on initial evaluation, provide pt/family education and to maximize pt's level of independence in the home and community environment.          GOALS: Short Term Goals:    1.  Nabil will be independent in HEP.  2. Nabil will report 4/10 pain at worst with working overhead.  3. Nabil will demonstrate increased UE strength to 4/5 for increased ease with lifting/carrying.  4.  Nabil will demonstrate a 50% improvement in R shoulder motion to improved ability to reach overhead and behind his back.        Long Term Goals:    1.  Nabil will report 1-2/10 pain at worst with sitting.  2.Nabil will demonstrate increased UE strength to 5/5 for increased ease with work related activities.  3.  Nabil will demonstrate WFL R shoulder mobility to improve ability to reach behind back.  4.  Pt will report at CI level (1-20% impaired) on FOTO UE survey score for pain disability to demonstrate decrease in disability and improvement in UE pain.    Plan     Continue PT towards established plan of care and goals.     Mai Estrada, PT

## 2018-09-25 ENCOUNTER — CLINICAL SUPPORT (OUTPATIENT)
Dept: REHABILITATION | Facility: HOSPITAL | Age: 38
End: 2018-09-25
Attending: FAMILY MEDICINE
Payer: COMMERCIAL

## 2018-09-25 DIAGNOSIS — G89.29 CHRONIC RIGHT SHOULDER PAIN: ICD-10-CM

## 2018-09-25 DIAGNOSIS — M25.511 CHRONIC RIGHT SHOULDER PAIN: ICD-10-CM

## 2018-09-25 DIAGNOSIS — M43.6 NECK STIFFNESS: ICD-10-CM

## 2018-09-25 PROCEDURE — 97110 THERAPEUTIC EXERCISES: CPT | Mod: PO

## 2018-09-25 NOTE — PROGRESS NOTES
"  Physical Therapy Daily Treatment Note     Name: Stanley Bancroft Harvey Jr.  Clinic Number: [id number]    Therapy Diagnosis:   Encounter Diagnoses   Name Primary?    Chronic right shoulder pain     Neck stiffness      Physician: Vaibhav Funk MD    Visit Date: 9/25/2018       Medical Diagnosis: M54.2 (ICD-10-CM) - Cervicalgia  M25.511 (ICD-10-CM) - Right shoulder pain, unspecified chronicity  Evaluation Date: 9/5/2018  Authorization period Expiration: 8/22/2019  Plan of Care Certification Period: 10/18/2018   Visit #: 6/ Visits authorized: 30  Time In: 0504  Time Out:  0556  Treatment time: 52     Precautions: Standard    Subjective     Pt reports: yesterday was his best day but he feels "old" today and has some soreness/stiffness in the shoulder.  Pain: 2/10 to (R) shoulder      Objective     Hernesto received therapeutic exercises to develop UE /cervical  strength, endurance, ROM, flexibility and posture for 61 minutes including:    UBE 2'fwd/2'bwd for warmup L1  Corner pec stretch 3 x 30 sec  AAROM on pulleys for shoulder flex and abd x 2 minutes each.   Bilateral shoulder extension BTB 3 x 10  Rows BTB 3 x 10  Shoulder ER/IR iso walk out with OTB x 10 each  Serratus wall slides with bolster 2 x 10  Scaption to 90 degrees  With 1# 2 x 10  Closed chain scap stabilization performing ABC's on wall x 2  AAROM shoulder flex with dowel in supine with 3# dowel x 10  Supine scap protract with 2# 2 x 10  Sidelying ER with 1# 2 x 10  Sidelying H- abd with 1# 2 x 10  Supine pec stretch over 1/2 foam roll 3'  Prone extension on PB 2 x 10  Prone horizontal abduction on PB 2 x 10    Hernesto received the following manual therapy techniques: Joint mobilizations were applied to the: (R) shoulder  for 10 minutes, including: ( NP today)   GH oscillations  Inferior/posterior glides grade ll-lll  STM pec insertion    Patient receives a cold pack applied to (R) shoulder x 10 minutes for pain control--NP patient declines     Written Home " Exercises Provided: Patient educated to continue with previously issued ex's to tolerance.     Exercises were reviewed and Hernesto was able to demonstrate them prior to the end of the session.  Hernesto demonstrated good  understanding of the education provided.        Assessment      Patient able to tolerate all therex during tx session today. Minimal improvement in pec flexibility noted during upper thoracic rotation.  Pt will continue to benefit from skilled outpatient physical therapy to address the deficits listed in the problem list box on initial evaluation, provide pt/family education and to maximize pt's level of independence in the home and community environment.          GOALS: Short Term Goals:    1.  Nabil will be independent in HEP.  2. Nabil will report 4/10 pain at worst with working overhead.  3. Nabil will demonstrate increased UE strength to 4/5 for increased ease with lifting/carrying.  4.  Nabil will demonstrate a 50% improvement in R shoulder motion to improved ability to reach overhead and behind his back.        Long Term Goals:    1.  Nabil will report 1-2/10 pain at worst with sitting.  2.Nabil will demonstrate increased UE strength to 5/5 for increased ease with work related activities.  3.  Nabil will demonstrate WFL R shoulder mobility to improve ability to reach behind back.  4.  Pt will report at CI level (1-20% impaired) on FOTO UE survey score for pain disability to demonstrate decrease in disability and improvement in UE pain.    Plan     Continue PT towards established plan of care and goals.     Mai Estrada, PT

## 2018-09-28 ENCOUNTER — CLINICAL SUPPORT (OUTPATIENT)
Dept: REHABILITATION | Facility: HOSPITAL | Age: 38
End: 2018-09-28
Attending: FAMILY MEDICINE
Payer: COMMERCIAL

## 2018-09-28 DIAGNOSIS — G89.29 CHRONIC RIGHT SHOULDER PAIN: ICD-10-CM

## 2018-09-28 DIAGNOSIS — M43.6 NECK STIFFNESS: ICD-10-CM

## 2018-09-28 DIAGNOSIS — M25.511 CHRONIC RIGHT SHOULDER PAIN: ICD-10-CM

## 2018-09-28 PROCEDURE — 97140 MANUAL THERAPY 1/> REGIONS: CPT | Mod: PO

## 2018-09-28 PROCEDURE — 97110 THERAPEUTIC EXERCISES: CPT | Mod: PO

## 2018-09-28 NOTE — PROGRESS NOTES
Physical Therapy Daily Treatment Note     Name: Stanley Bancroft Harvey Jr.  Clinic Number: [id number]    Therapy Diagnosis:   Encounter Diagnoses   Name Primary?    Chronic right shoulder pain     Neck stiffness      Physician: Vaibhav Funk MD    Visit Date: 9/28/2018       Medical Diagnosis: M54.2 (ICD-10-CM) - Cervicalgia  M25.511 (ICD-10-CM) - Right shoulder pain, unspecified chronicity  Evaluation Date: 9/5/2018  Authorization period Expiration: 8/22/2019  Plan of Care Certification Period: 10/18/2018   Visit #:7/ Visits authorized: 30  Time In: 8:05  Time Out: 9:05  Treatment time: 60     Precautions: Standard    Subjective     Pt reports: feeling tired. Min (R) shoulder stiffness/soreness. He denies c/o neck pain.  Pain: 2-3/10 to (R) shoulder      Objective     Hernesto received therapeutic exercises to develop UE /cervical  strength, endurance, ROM, flexibility and posture for 50 minutes including:    UBE 2.5'fwd/2.5'bwd for warmup L1    Standing ex's:   Corner pec stretch 3 x 30 sec  AAROM on pulleys for shoulder flex and abd x 2 minutes each.   Bilateral shoulder extension  On cable cross with 10#  3 x 10  Rows on cable cross with 7#  3 x 10  Shoulder ER/IR iso walk out on cable cross with 3# ER and 7# IR x 10 each  Serratus wall slides with bolster 2 x 10  Scaption to 90 degrees  With 1# 2 x 10--NP   Closed chain scap stabilization performing ABC's on wall      Supine ex's:   AAROM shoulder flex with dowel in supine with 4# dowel x 10  Supine ( HOB elevated) scap protract with 3# 2 x 10  Supine pec stretch over 1/2 foam roll 3'    Sidelying ex's:   Sidelying ER with 1# 2 x 12  Sidelying H- abd with 1# 2 x 10--NP    Prone ex's:   Prone extension on PB 2 x 10  Prone horizontal abduction on PB 2 x 10  Prone scaption on PB 2 x 10    Hernesto received the following manual therapy techniques: Joint mobilizations were applied to the: (R) shoulder  for 10 minutes, including:     GH oscillations  Inferior/posterior  glides grade ll-lll  STM pec insertion    Patient receives a cold pack applied to (R) shoulder x 10 minutes for pain control--NP patient declines     Written Home Exercises Provided: Patient educated to continue with previously issued ex's to tolerance.     Exercises were reviewed and Hernesto was able to demonstrate them prior to the end of the session.  Hernesto demonstrated good  understanding of the education provided.        Assessment      Patient able antoinette [place] fairly well. He was progressed to perform cable cross ex's within tolerable level of muscular fatigue and discomfort. He remains challenged with closed chain scap stabilization with ABC's on the wall and requires a brief rest break to complete entire alphabet.Still requires cues for postural awareness and scap engagement with ex's/activities. Some pec tightness evident which improves slightly with manual techniques and passive stretching.  Pt will continue to benefit from skilled outpatient physical therapy to address the deficits listed in the problem list box on initial evaluation, provide pt/family education and to maximize pt's level of independence in the home and community environment.          GOALS: Short Term Goals:    1.  Nabil will be independent in HEP.  2. Nabil will report 4/10 pain at worst with working overhead.  3. Nabil will demonstrate increased UE strength to 4/5 for increased ease with lifting/carrying.  4.  Nabil will demonstrate a 50% improvement in R shoulder motion to improved ability to reach overhead and behind his back.        Long Term Goals:    1.  Nabil will report 1-2/10 pain at worst with sitting.  2.Nabil will demonstrate increased UE strength to 5/5 for increased ease with work related activities.  3.  Nabil will demonstrate WFL R shoulder mobility to improve ability to reach behind back.  4.  Pt will report at CI level (1-20% impaired) on FOTO UE survey score for pain disability to demonstrate decrease in disability  and improvement in UE pain.    Plan     Continue PT towards established plan of care and goals.     Rahul Lopez, PTA

## 2018-10-02 ENCOUNTER — CLINICAL SUPPORT (OUTPATIENT)
Dept: REHABILITATION | Facility: HOSPITAL | Age: 38
End: 2018-10-02
Attending: FAMILY MEDICINE
Payer: COMMERCIAL

## 2018-10-02 DIAGNOSIS — M25.511 CHRONIC RIGHT SHOULDER PAIN: ICD-10-CM

## 2018-10-02 DIAGNOSIS — G89.29 CHRONIC RIGHT SHOULDER PAIN: ICD-10-CM

## 2018-10-02 DIAGNOSIS — M43.6 NECK STIFFNESS: ICD-10-CM

## 2018-10-02 PROCEDURE — 97150 GROUP THERAPEUTIC PROCEDURES: CPT | Mod: PO

## 2018-10-02 PROCEDURE — 97110 THERAPEUTIC EXERCISES: CPT

## 2018-10-02 NOTE — PROGRESS NOTES
Physical Therapy Daily Treatment Note     Name: Stanley Bancroft Harvey Jr.  Clinic Number: [id number]    Therapy Diagnosis:   Encounter Diagnoses   Name Primary?    Chronic right shoulder pain     Neck stiffness      Physician: Vaibhav Funk MD    Visit Date: 10/2/2018       Medical Diagnosis: M54.2 (ICD-10-CM) - Cervicalgia  M25.511 (ICD-10-CM) - Right shoulder pain, unspecified chronicity  Evaluation Date: 9/5/2018  Authorization period Expiration: 8/22/2019  Plan of Care Certification Period: 10/18/2018   Visit #:8/ Visits authorized: 30  Time In: 0458  Time Out: 0545  Treatment time: 47     Precautions: Standard    Subjective     Pt reports: that he has more pain today and had difficulty days over the weekend.  Patient stated that he went fishing and the pain seemed to increased following that.  Pain: 5/10 to (R) shoulder      Objective     Hernesto received therapeutic exercises to develop UE /cervical  strength, endurance, ROM, flexibility and posture for 47 minutes including:    UBE 3'fwd/3'bwd for warmup L1    Standing ex's:   Corner pec stretch 3 x 30 sec  AAROM on pulleys for shoulder flex and abd x 2 minutes each.   Bilateral shoulder extension  On cable cross with 10#  3 x 10 (performed with OTB secondary to increased pain)  Rows on cable cross with 7#  3 x 10 (performed with OTB secondary to increased pain)  Shoulder ER/IR iso walk out on cable cross with 3# ER and 7# IR 3 x 10 each (performed with OTB secondary to increased pain)  Serratus wall slides with bolster 2 x 10  Scaption to 90 degrees  With 1# 2 x 10--NP   Closed chain scap stabilization performing ABC's on wall-NP    Supine ex's:   AAROM shoulder flex with dowel in supine with dowel 3 x 10  Supine ( HOB elevated) scap protract with 3# 2 x 10  Supine pec stretch over 1/2 foam roll 3'    Sidelying ex's:   Sidelying ER with 1# 2 x 12-NP  Sidelying H- abd with 1# 2 x 10--NP    Prone ex's:   Prone extension on PB 2 x 10-NP  Prone horizontal  abduction on PB 2 x 10-NP  Prone scaption on PB 2 x 10-NP    Hernesto received the following manual therapy techniques: Joint mobilizations were applied to the: (R) shoulder  for 10 minutes, including:-NP    GH oscillations  Inferior/posterior glides grade ll-lll  STM pec insertion    Patient receives a cold pack applied to (R) shoulder x 10 minutes for pain control--NP patient declines     Written Home Exercises Provided: Patient educated to stretch and use ice at home to help with increased pain levels.      Exercises were reviewed and Hernesto was able to demonstrate them prior to the end of the session.  Hernesto demonstrated good  understanding of the education provided.        Assessment      Limited therex program performed today secondary to increased pain levels today.  Patient demonstrate pec tightness and R UT with tenderness also present.  Strengthening exercises performed with therabands today secondary to increased pain levels.    Pt will continue to benefit from skilled outpatient physical therapy to address the deficits listed in the problem list box on initial evaluation, provide pt/family education and to maximize pt's level of independence in the home and community environment.          GOALS: Short Term Goals:    1.  Nabil will be independent in HEP.  2. Nabil will report 4/10 pain at worst with working overhead.  3. Nabil will demonstrate increased UE strength to 4/5 for increased ease with lifting/carrying.  4.  Nabil will demonstrate a 50% improvement in R shoulder motion to improved ability to reach overhead and behind his back.        Long Term Goals:    1.  Nabil will report 1-2/10 pain at worst with sitting.  2.Nabil will demonstrate increased UE strength to 5/5 for increased ease with work related activities.  3.  Nabil will demonstrate WFL R shoulder mobility to improve ability to reach behind back.  4.  Pt will report at CI level (1-20% impaired) on FOTO UE survey score for pain  disability to demonstrate decrease in disability and improvement in UE pain.    Plan     Continue PT towards established plan of care and goals.     Mai Estrada, PT

## 2018-10-04 ENCOUNTER — CLINICAL SUPPORT (OUTPATIENT)
Dept: REHABILITATION | Facility: HOSPITAL | Age: 38
End: 2018-10-04
Attending: FAMILY MEDICINE
Payer: COMMERCIAL

## 2018-10-04 ENCOUNTER — OFFICE VISIT (OUTPATIENT)
Dept: CARDIOLOGY | Facility: CLINIC | Age: 38
End: 2018-10-04
Payer: COMMERCIAL

## 2018-10-04 VITALS
DIASTOLIC BLOOD PRESSURE: 88 MMHG | BODY MASS INDEX: 40.3 KG/M2 | SYSTOLIC BLOOD PRESSURE: 146 MMHG | HEIGHT: 68 IN | HEART RATE: 80 BPM | WEIGHT: 265.88 LBS

## 2018-10-04 DIAGNOSIS — G89.29 CHRONIC RIGHT SHOULDER PAIN: ICD-10-CM

## 2018-10-04 DIAGNOSIS — R94.31 NONSPECIFIC ABNORMAL ELECTROCARDIOGRAM (ECG) (EKG): ICD-10-CM

## 2018-10-04 DIAGNOSIS — M25.511 CHRONIC RIGHT SHOULDER PAIN: ICD-10-CM

## 2018-10-04 DIAGNOSIS — M43.6 NECK STIFFNESS: ICD-10-CM

## 2018-10-04 PROCEDURE — 3008F BODY MASS INDEX DOCD: CPT | Mod: CPTII,S$GLB,, | Performed by: INTERNAL MEDICINE

## 2018-10-04 PROCEDURE — 99204 OFFICE O/P NEW MOD 45 MIN: CPT | Mod: S$GLB,,, | Performed by: INTERNAL MEDICINE

## 2018-10-04 PROCEDURE — 99999 PR PBB SHADOW E&M-EST. PATIENT-LVL III: CPT | Mod: PBBFAC,,, | Performed by: INTERNAL MEDICINE

## 2018-10-04 PROCEDURE — 97140 MANUAL THERAPY 1/> REGIONS: CPT | Mod: PO

## 2018-10-04 PROCEDURE — 97110 THERAPEUTIC EXERCISES: CPT | Mod: PO

## 2018-10-04 NOTE — PROGRESS NOTES
Physical Therapy Daily Treatment Note     Name: Stanley Bancroft Harvey Jr.  Clinic Number: [id number]    Therapy Diagnosis:   Encounter Diagnoses   Name Primary?    Chronic right shoulder pain     Neck stiffness      Physician: Vaibhav Funk MD    Visit Date: 10/4/2018       Medical Diagnosis: M54.2 (ICD-10-CM) - Cervicalgia  M25.511 (ICD-10-CM) - Right shoulder pain, unspecified chronicity  Evaluation Date: 9/5/2018  Authorization period Expiration: 8/22/2019  Plan of Care Certification Period: 10/18/2018   Visit #:9/ Visits authorized: 30  Time In: 9:05  Time Out: 10:00  Treatment time: 55     Precautions: Standard    Subjective     Pt reports:symptoms are diminished today compared to previous TX.   Pain: 2-3/10 to (R) shoulder      Objective     Hernesto received therapeutic exercises to develop UE /cervical  strength, endurance, ROM, flexibility and posture for 47 minutes including:    UBE x 5 minutes ( 2.5 min forward/backward) level 2    Standing ex's:   Doorway pec stretch 3 x 30 sec  AAROM on pulleys for shoulder flex and abd x 2 minutes each.   Bilateral shoulder extension  On cable cross with 10# 2 x 10    Rows on cable cross with 7#  2 x 10 (performed with OTB secondary to increased pain)  Shoulder ER/IR iso walk out on cable cross with 3# ER and 7# IR 2 x 10 each   Serratus wall slides with bolster 2 x 10  Bruggers ( scap retract with ER) YTB 2 x 10  Scaption to 90 degrees  With 1# 2 x 10--NP   Closed chain scap stabilization performing ABC's on wall-NP    Supine ex's:   AAROM shoulder flex with dowel in supine with dowel while lying over 1/2 foam roll  2 x 10  Supine ( HOB elevated) scap protract with 3# 2 x 10--NP  Supine pec stretch over 1/2 foam roll 3'  D2 PNF flex/ext 2 x 10    Sidelying ex's:   Sidelying ER with 1# 2 x 12-NP  Sidelying H- abd with 1# 2 x 10--NP    Prone ex's:   Prone extension on PB 2 x 10   Prone horizontal abduction on PB 2 x 10   Prone scaption on PB 2 x 10     Hernesto received  the following manual therapy techniques: Joint mobilizations were applied to the: (R) shoulder  for 10 minutes, including:-NP    GH oscillations  Inferior/posterior glides grade ll-lll  STM pec insertion    Patient receives a cold pack applied to (R) shoulder x 10 minutes for pain control--NP patient declines     Written Home Exercises Provided: Patient educated to stretch and use ice at home to help with increased pain levels.      Exercises were reviewed and Hernesto was able to demonstrate them prior to the end of the session.  Hernesto demonstrated good  understanding of the education provided.        Assessment   Patient antoinette Tx fairly well. He was able to resume some cable cross ex's within appropriate level of muscular fatigue and tolerable discomfort.Still requires cues for postural awareness and scap engagement with ex's/activities a times.. Some pec tightness evident which improves slightly with manual techniques and passive stretching. Overall symptoms have improved slightly since the start of care although still lingering.  Patient to follow up with his MD next week and will call back to scheduled additional appts     Pt will continue to benefit from skilled outpatient physical therapy to address the deficits listed in the problem list box on initial evaluation, provide pt/family education and to maximize pt's level of independence in the home and community environment.      GOALS: Short Term Goals:    1.  Nabil will be independent in HEP.  2. Nabil will report 4/10 pain at worst with working overhead.  3. Nabil will demonstrate increased UE strength to 4/5 for increased ease with lifting/carrying.  4.  Nabil will demonstrate a 50% improvement in R shoulder motion to improved ability to reach overhead and behind his back.        Long Term Goals:    1.  Nabil will report 1-2/10 pain at worst with sitting.  2.Nabil will demonstrate increased UE strength to 5/5 for increased ease with work related  activities.  3.  Nabil will demonstrate WFL R shoulder mobility to improve ability to reach behind back.  4.  Pt will report at CI level (1-20% impaired) on FOTO UE survey score for pain disability to demonstrate decrease in disability and improvement in UE pain.    Plan     Continue PT towards established plan of care and goals.     Rahul Lopez, PTA

## 2018-10-09 ENCOUNTER — OFFICE VISIT (OUTPATIENT)
Dept: FAMILY MEDICINE | Facility: CLINIC | Age: 38
End: 2018-10-09
Payer: COMMERCIAL

## 2018-10-09 VITALS
HEIGHT: 68 IN | DIASTOLIC BLOOD PRESSURE: 78 MMHG | WEIGHT: 267 LBS | BODY MASS INDEX: 40.47 KG/M2 | RESPIRATION RATE: 18 BRPM | TEMPERATURE: 99 F | HEART RATE: 86 BPM | SYSTOLIC BLOOD PRESSURE: 138 MMHG | OXYGEN SATURATION: 98 %

## 2018-10-09 DIAGNOSIS — G89.29 CHRONIC RIGHT SHOULDER PAIN: ICD-10-CM

## 2018-10-09 DIAGNOSIS — M25.511 CHRONIC RIGHT SHOULDER PAIN: ICD-10-CM

## 2018-10-09 DIAGNOSIS — M54.2 CERVICALGIA: ICD-10-CM

## 2018-10-09 DIAGNOSIS — G43.109 MIGRAINE WITH AURA AND WITHOUT STATUS MIGRAINOSUS, NOT INTRACTABLE: Primary | ICD-10-CM

## 2018-10-09 PROCEDURE — 90471 IMMUNIZATION ADMIN: CPT | Mod: S$GLB,,, | Performed by: FAMILY MEDICINE

## 2018-10-09 PROCEDURE — 90715 TDAP VACCINE 7 YRS/> IM: CPT | Mod: S$GLB,,, | Performed by: FAMILY MEDICINE

## 2018-10-09 PROCEDURE — 99214 OFFICE O/P EST MOD 30 MIN: CPT | Mod: 25,S$GLB,, | Performed by: FAMILY MEDICINE

## 2018-10-09 PROCEDURE — 99999 PR PBB SHADOW E&M-EST. PATIENT-LVL III: CPT | Mod: PBBFAC,,, | Performed by: FAMILY MEDICINE

## 2018-10-09 PROCEDURE — 3008F BODY MASS INDEX DOCD: CPT | Mod: CPTII,S$GLB,, | Performed by: FAMILY MEDICINE

## 2018-10-09 RX ORDER — SUMATRIPTAN 50 MG/1
50 TABLET, FILM COATED ORAL
Qty: 8 TABLET | Refills: 2 | Status: SHIPPED | OUTPATIENT
Start: 2018-10-09 | End: 2021-06-04

## 2018-10-09 NOTE — PROGRESS NOTES
Subjective:       Patient ID: Stanley Bancroft Harvey Jr. is a 38 y.o. male.    Chief Complaint: shoulder follow up and Headache    HPI     Here for a f/u.    Has been attending physio for right shoulder since 4-5 weeks ago but has stopped for the past 1 week. Reports improvement in right shoulder pain and rarely having pain at night.       Also, reports occasional left sided neck pain x 2-3 months.     Reports right sided migraine with aura and associated nausea and light/noise sensitivity that started yesterday. No pain today.      Review of Systems      Review of Systems   Constitutional: Negative for fever and chills.   HENT: Negative for hearing loss and neck stiffness.    Eyes: Negative for redness and itching.   Respiratory: Negative for cough and choking.    Cardiovascular: Negative for chest pain and leg swelling.  Abdomen: Negative for abdominal pain and blood in stool.   Genitourinary: Negative for dysuria and flank pain.   Musculoskeletal: Negative for back pain and gait problem.   Neurological: Negative for light-headedness and headaches.   Hematological: Negative for adenopathy.   Psychiatric/Behavioral: Negative for behavioral problems.     Objective:      Physical Exam   HENT:   Head: Atraumatic.   Eyes: Conjunctivae are normal. Pupils are equal, round, and reactive to light.   Neck: Normal range of motion.   Cardiovascular: Normal rate and regular rhythm.   No murmur heard.  Pulmonary/Chest: Effort normal and breath sounds normal. He has no wheezes.   Musculoskeletal:   Right shoulder: mild tend of post joint.  Mild pain with abduction > 120 deg.      Cervical spine: tend of left paravert area.  Dec rom with flex/ext.     Lymphadenopathy:     He has no cervical adenopathy.       Assessment:       1. Migraine with aura and without status migrainosus, not intractable    2. Cervicalgia    3. Chronic right shoulder pain        Plan:       Migraine with aura and without status migrainosus, not  intractable    Cervicalgia    Chronic right shoulder pain    Other orders  -     sumatriptan (IMITREX) 50 MG tablet; Take 1 tablet (50 mg total) by mouth every 2 (two) hours as needed for Migraine.  Dispense: 8 tablet; Refill: 2  -     (In Office Administered) Tdap Vaccine      Plan:  rx imitrex for migraines. If migraines start becoming persistent, then will start him on prophylactic med such as verapamil  tdap today  Restart physio for cervicalgia         Medication List           Accurate as of 10/9/18  7:08 PM. If you have any questions, ask your nurse or doctor.               START taking these medications    sumatriptan 50 MG tablet  Commonly known as:  IMITREX  Take 1 tablet (50 mg total) by mouth every 2 (two) hours as needed for Migraine.  Started by:  Vaibhav Funk MD        CONTINUE taking these medications    naproxen 500 MG tablet  Commonly known as:  NAPROSYN  Take 1 tablet (500 mg total) by mouth 2 (two) times daily.           Where to Get Your Medications      These [id number] [place] - [address] AT [facility] [address]  [address] [address]    Phone:  [phone]   · sumatriptan 50 MG tablet

## 2018-10-11 ENCOUNTER — PATIENT MESSAGE (OUTPATIENT)
Dept: FAMILY MEDICINE | Facility: CLINIC | Age: 38
End: 2018-10-11

## 2018-10-11 DIAGNOSIS — R86.8 SPERM NUMBER IN LOW TO UNDETECTABLE RANGE: Primary | ICD-10-CM

## 2018-10-12 ENCOUNTER — CLINICAL SUPPORT (OUTPATIENT)
Dept: CARDIOLOGY | Facility: CLINIC | Age: 38
End: 2018-10-12
Attending: INTERNAL MEDICINE
Payer: COMMERCIAL

## 2018-10-12 VITALS — BODY MASS INDEX: 40.32 KG/M2 | WEIGHT: 266 LBS | HEIGHT: 68 IN

## 2018-10-12 DIAGNOSIS — R94.31 NONSPECIFIC ABNORMAL ELECTROCARDIOGRAM (ECG) (EKG): ICD-10-CM

## 2018-10-12 PROCEDURE — 93351 STRESS TTE COMPLETE: CPT | Mod: S$GLB,,, | Performed by: INTERNAL MEDICINE

## 2018-10-12 PROCEDURE — 99999 PR PBB SHADOW E&M-EST. PATIENT-LVL II: CPT | Mod: PBBFAC,,,

## 2018-10-13 LAB
ASCENDING AORTA: 3.22 CM
BSA FOR ECHO PROCEDURE: 2.41 M2
CV ECHO LV RWT: 0.33 CM
CV STRESS BASE HR: 75
DIASTOLIC BLOOD PRESSURE: 77
DOP CALC LVOT AREA: 4.6 CM2
DOP CALC LVOT DIAMETER: 2.42 CM
DOP CALC LVOT STROKE VOLUME: 83.26 CM3
DOP CALCLVOT PEAK VEL VTI: 18.11 CM
E WAVE DECELERATION TIME: 181.5 MSEC
E/A RATIO: 1.32
E/E' RATIO: 7.18
ECHO LV POSTERIOR WALL: 0.85 CM (ref 0.6–1.1)
FRACTIONAL SHORTENING: 26 % (ref 28–44)
INTERVENTRICULAR SEPTUM: 0.85 CM (ref 0.6–1.1)
IVRT: 0.07 MSEC
LA MAJOR: 4.72 CM
LA MINOR: 4.53 CM
LA WIDTH: 3.35 CM
LEFT ATRIUM SIZE: 3.21 CM
LEFT ATRIUM VOLUME INDEX: 17.5 ML/M2
LEFT ATRIUM VOLUME: 42.26 CM3
LEFT INTERNAL DIMENSION IN SYSTOLE: 3.77 CM (ref 2.1–4)
LEFT VENTRICLE DIASTOLIC VOLUME INDEX: 51.63 ML/M2
LEFT VENTRICLE DIASTOLIC VOLUME: 124.43 ML
LEFT VENTRICLE MASS INDEX: 63.2 G/M2
LEFT VENTRICLE SYSTOLIC VOLUME INDEX: 25.2 ML/M2
LEFT VENTRICLE SYSTOLIC VOLUME: 60.73 ML
LEFT VENTRICULAR INTERNAL DIMENSION IN DIASTOLE: 5.11 CM (ref 3.5–6)
LEFT VENTRICULAR MASS: 152.35 G
LV LATERAL E/E' RATIO: 5.64
LV SEPTAL E/E' RATIO: 9.88
MV PEAK A VEL: 0.6 M/S
MV PEAK E VEL: 0.79 M/S
MV STENOSIS PRESSURE HALF TIME: 52.64 MS
MV VALVE AREA P 1/2 METHOD: 4.18 CM2
OHS CV CPX 1 MINUTE RECOVERY HEART RATE: 129 BPM
OHS CV CPX 85 PERCENT MAX PREDICTED HEART RATE MALE: 156
OHS CV CPX ESTIMATED METS: 15
OHS CV CPX MAX PREDICTED HEART RATE: 183
OHS CV CPX PATIENT IS FEMALE: 0
OHS CV CPX PATIENT IS MALE: 1
OHS CV CPX PEAK DIASTOLIC BLOOD PRESSURE: 75 MMHG
OHS CV CPX PEAK HEAR RATE: 171
OHS CV CPX PEAK RATE PRESSURE PRODUCT: NORMAL
OHS CV CPX PEAK SYSTOLIC BLOOD PRESSURE: 219
OHS CV CPX PERCENT MAX PREDICTED HEART RATE ACHIEVED: 93
OHS CV CPX PERCENT TARGET HEART RATE ACHIEVED: 110.32
OHS CV CPX RATE PRESSURE PRODUCT PRESENTING: 9975
OHS CV CPX TARGET HEART RATE: 155
RA MAJOR: 3.89 CM
RA WIDTH: 3.48 CM
RETIRED EF AND QEF - SEE NOTES: 51.19 %
SINUS: 3.13 CM
STJ: 3.17 CM
STRESS ECHO POST EXERCISE DUR MIN: 7 MIN
STRESS ECHO POST EXERCISE DUR SEC: 50
SYSTOLIC BLOOD PRESSURE: 133
TDI LATERAL: 0.14
TDI SEPTAL: 0.08
TDI: 0.11

## 2018-10-16 ENCOUNTER — PATIENT MESSAGE (OUTPATIENT)
Dept: FAMILY MEDICINE | Facility: CLINIC | Age: 38
End: 2018-10-16

## 2018-10-20 ENCOUNTER — LAB VISIT (OUTPATIENT)
Dept: LAB | Facility: HOSPITAL | Age: 38
End: 2018-10-20
Attending: FAMILY MEDICINE
Payer: COMMERCIAL

## 2018-10-20 DIAGNOSIS — R86.8 SPERM NUMBER IN LOW TO UNDETECTABLE RANGE: ICD-10-CM

## 2018-10-20 LAB
FSH SERPL-ACNC: 15.8 MIU/ML
LH SERPL-ACNC: 7.7 MIU/ML
PROLACTIN SERPL IA-MCNC: 16.6 NG/ML
TESTOST SERPL-MCNC: 301 NG/DL

## 2018-10-20 PROCEDURE — 83002 ASSAY OF GONADOTROPIN (LH): CPT

## 2018-10-20 PROCEDURE — 84403 ASSAY OF TOTAL TESTOSTERONE: CPT

## 2018-10-20 PROCEDURE — 36415 COLL VENOUS BLD VENIPUNCTURE: CPT | Mod: PO

## 2018-10-20 PROCEDURE — 83001 ASSAY OF GONADOTROPIN (FSH): CPT

## 2018-10-20 PROCEDURE — 84146 ASSAY OF PROLACTIN: CPT

## 2018-10-24 ENCOUNTER — OFFICE VISIT (OUTPATIENT)
Dept: UROLOGY | Facility: CLINIC | Age: 38
End: 2018-10-24
Payer: COMMERCIAL

## 2018-10-24 VITALS
HEIGHT: 68 IN | SYSTOLIC BLOOD PRESSURE: 125 MMHG | DIASTOLIC BLOOD PRESSURE: 78 MMHG | BODY MASS INDEX: 40.44 KG/M2 | WEIGHT: 266.81 LBS | HEART RATE: 79 BPM

## 2018-10-24 DIAGNOSIS — N50.0 BILATERAL TESTICULAR ATROPHY: ICD-10-CM

## 2018-10-24 DIAGNOSIS — E29.1 HYPOGONADISM MALE: Primary | ICD-10-CM

## 2018-10-24 PROCEDURE — 3008F BODY MASS INDEX DOCD: CPT | Mod: CPTII,S$GLB,, | Performed by: UROLOGY

## 2018-10-24 PROCEDURE — 99214 OFFICE O/P EST MOD 30 MIN: CPT | Mod: S$GLB,,, | Performed by: UROLOGY

## 2018-10-24 PROCEDURE — 99999 PR PBB SHADOW E&M-EST. PATIENT-LVL III: CPT | Mod: PBBFAC,,, | Performed by: UROLOGY

## 2018-10-24 NOTE — PROGRESS NOTES
UROLOGY [place]  10 24 18    Cc low sperm count    Age 38 , comes in because he and his wife have been  for one year and no pregnancies have occurred. They have been trying to have a baby with no results. Wife also has been found to have no ovulations, she is 31; is currently being evaluated by a gynecology service. That team asked for pt to provide a semen sample, and in their report they described no sperm at all. They recommended pt to consult with urology. Pt and wife have no previous hx of pregnancy in previous relationships. Denies exposure to excessive diagnostic xrays or other ionizing radiation of any kind. Denies trauma to the genital area. No hx STD or of any genitourinary infection. Pt says he did have mumps during childhood and had marked swelling of the face.     Pt says he voids well, and has no erection complaints.       PMH    Surgical:  has a past surgical history that includes Circumcision.    Medical: migraines, obesity, mumps during childhood    Familial: no fh of kidney stones    Social: , works for [employer] in [place]    Meds: imitrex      REVIEW OF SYSTEMS  GENERAL: has complaints of fatigue. Occasional headaches, no dizzy spells.   HEENT: vision wears glasses. Sinuses: has chronic complaints.   CARDIOPULMONARY: No swelling of the legs; no chest pain. No shortness of breath.   GASTROINTESTINAL: has heartburn. Often has diarrhea; denies constipation, no blood or mucus in stools.   GENITOURINARY: Denies dysuria, bleeding or incontinence.   MUSCULOSKELETAL: occasional arthritic complaints such as pain or stiffness.   PSYCHIATRIC: No history of depression or anxiety.   ENDOCRINOLOGIC: is overweight. No reports of sweating, cold or heat intolerance.   NEUROLOGICAL: No dizziness, syncope, paralysis  LYMPHATICS: No enlarged nodes. No history of splenectomy.  ==    Pt alert, oriented, no distress  HEENT: wnl.  Neck: supple, no JVD, no lymphadenopathy  Chest: CV NSR  Lungs:  normal chest expansion, no labored breathing  Abdomen slightly prominent, nontender, no organomegaly, no masses.  No hernias  Penis circumcised, meatus nl  Testes are small bilaterally, soft, epididymides normal bilaterally, scrotum normally developed  MIGUELITO: anus nl, sphincter nl tone, mucosa without lesions, prostate 20 gm, symmetric, no nodules or indurations. I massaged prostate and obtained EPS: normal  Extremities: no edema, peripheral pulses nl  Neuro: preserved    Testosterone 10 20 18: 301  LH 7.7 (6 to 12)  FSH 15.80 (1 to 12)    Testosterone is low, and LH and FSH are high, as the pituitary is trying unsuccessfully to stimulate the testicle to elevate function levels.      IMP    Hypogonadism  Azoospermia  Bilateral testicular atrophy    Discussed situation with pt. He is wont be able to produce a pregnancy with hormonal derangement and current semen findings, in addition to the fact that wife has problems of her own in her reproductive tract. Couple should consult an andrology specialist to evaluate realistic chances of conception with testicular harvesting of sperm and advanced in vitro techniques. It is always possible for the couple to be considered for adoption. It is also possible, if wife's anovulation problem can be overcome, to use donor sperm from a reputable semen bank.   I [facility] devoted to difficult infertility cases.

## 2018-10-24 NOTE — LETTER
October 25, 2018      Vaibhav Funk MD  [address] [facility] [address] [place] [address]           [facility]  [address] [facility] [address] [place] [address]  Phone: [phone]          Patient: Stanley Bancroft Harvey Jr.   MR Number: [id number]   YOB: 1980   Date of Visit: 10/24/2018       Dear Dr. Vaibhav Funk:    Thank you for referring Nabil Mora to me for evaluation. Attached you will find relevant portions of my assessment and plan of care.    If you have questions, please do not hesitate to call me. I look forward to following Nabil Mora along with you.    Sincerely,    Lee Rocha MD    Enclosure  CC:  No Recipients    If you would like to receive this communication electronically, please contact [email or web address] or [phone] to request more information on [employer] access.    For providers and/or their staff who would like to refer a patient to [facility], please contact us through our one-stop-shop provider referral line, [facility], at [phone].    If you feel you have received this communication in error or would no longer like to receive these types of communications, please e-mail [email or web address]

## 2018-11-08 ENCOUNTER — PATIENT MESSAGE (OUTPATIENT)
Dept: FAMILY MEDICINE | Facility: CLINIC | Age: 38
End: 2018-11-08

## 2018-11-11 ENCOUNTER — PATIENT MESSAGE (OUTPATIENT)
Dept: FAMILY MEDICINE | Facility: CLINIC | Age: 38
End: 2018-11-11

## 2020-01-07 ENCOUNTER — OFFICE VISIT (OUTPATIENT)
Dept: CARDIOLOGY | Facility: CLINIC | Age: 40
End: 2020-01-07
Payer: COMMERCIAL

## 2020-01-07 VITALS
BODY MASS INDEX: 38.65 KG/M2 | WEIGHT: 255.06 LBS | HEIGHT: 68 IN | DIASTOLIC BLOOD PRESSURE: 81 MMHG | HEART RATE: 72 BPM | SYSTOLIC BLOOD PRESSURE: 140 MMHG

## 2020-01-07 DIAGNOSIS — R07.1 CHEST PAIN ON BREATHING: ICD-10-CM

## 2020-01-07 PROCEDURE — 3008F BODY MASS INDEX DOCD: CPT | Mod: CPTII,S$GLB,, | Performed by: INTERNAL MEDICINE

## 2020-01-07 PROCEDURE — 93000 ELECTROCARDIOGRAM COMPLETE: CPT | Mod: S$GLB,,, | Performed by: INTERNAL MEDICINE

## 2020-01-07 PROCEDURE — 99999 PR PBB SHADOW E&M-EST. PATIENT-LVL III: CPT | Mod: PBBFAC,,, | Performed by: INTERNAL MEDICINE

## 2020-01-07 PROCEDURE — 99214 OFFICE O/P EST MOD 30 MIN: CPT | Mod: S$GLB,,, | Performed by: INTERNAL MEDICINE

## 2020-01-07 PROCEDURE — 3008F PR BODY MASS INDEX (BMI) DOCUMENTED: ICD-10-PCS | Mod: CPTII,S$GLB,, | Performed by: INTERNAL MEDICINE

## 2020-01-07 PROCEDURE — 99214 PR OFFICE/OUTPT VISIT, EST, LEVL IV, 30-39 MIN: ICD-10-PCS | Mod: S$GLB,,, | Performed by: INTERNAL MEDICINE

## 2020-01-07 PROCEDURE — 99999 PR PBB SHADOW E&M-EST. PATIENT-LVL III: ICD-10-PCS | Mod: PBBFAC,,, | Performed by: INTERNAL MEDICINE

## 2020-01-07 PROCEDURE — 93000 EKG 12-LEAD: ICD-10-PCS | Mod: S$GLB,,, | Performed by: INTERNAL MEDICINE

## 2020-01-07 RX ORDER — INDOMETHACIN 25 MG/1
25 CAPSULE ORAL 3 TIMES DAILY
COMMUNITY
End: 2021-06-04

## 2020-01-07 NOTE — PROGRESS NOTES
Subjective:    Patient ID:  Stanley Bancroft Harvey Jr. is a 39 y.o. male who presents for follow-up of chest pain    HPI  He states he developed sharp chest pain last week when at work, not doing anything strenuous. [facility]u including blood work, ekg, ct of the chest. Discharged with indomethacin.  Pain has gotten better but it's still there with deep inspiration    Review of Systems   Constitution: Negative for decreased appetite, malaise/fatigue, weight gain and weight loss.   Cardiovascular: Negative for chest pain, dyspnea on exertion, leg swelling, palpitations and syncope.   Respiratory: Negative for cough and shortness of breath.    Gastrointestinal: Negative.    Neurological: Negative for weakness.   All other systems reviewed and are negative.       Objective:      Physical Exam   Constitutional: He is oriented to person, place, and time. He appears well-developed and well-nourished.   HENT:   Head: Normocephalic.   Eyes: Pupils are equal, round, and reactive to light.   Neck: Normal range of motion. Neck supple. No JVD present. Carotid bruit is not present. No thyromegaly present.   Cardiovascular: Normal rate, regular rhythm, normal heart sounds, intact distal pulses and normal pulses. PMI is not displaced. Exam reveals no gallop.   No murmur heard.  Pulmonary/Chest: Effort normal and breath sounds normal.   Abdominal: Soft. Normal appearance. He exhibits no mass. There is no hepatosplenomegaly. There is no tenderness.   Musculoskeletal: Normal range of motion. He exhibits no edema.   Neurological: He is alert and oriented to person, place, and time. He has normal strength and normal reflexes. No sensory deficit.   Skin: Skin is warm and intact.   Psychiatric: He has a normal mood and affect.   Nursing note and vitals reviewed.    ekg normal      Assessment:       1. Chest pain on breathing         Plan:   NSAID x 3-4 days  Call us back next week if not better

## 2020-01-08 DIAGNOSIS — R07.1 CHEST PAIN ON BREATHING: Primary | ICD-10-CM

## 2020-01-13 ENCOUNTER — PATIENT MESSAGE (OUTPATIENT)
Dept: CARDIOLOGY | Facility: CLINIC | Age: 40
End: 2020-01-13

## 2020-01-13 DIAGNOSIS — R00.2 PALPITATIONS: ICD-10-CM

## 2020-01-13 DIAGNOSIS — R94.31 NONSPECIFIC ABNORMAL ELECTROCARDIOGRAM (ECG) (EKG): Primary | ICD-10-CM

## 2020-01-13 NOTE — TELEPHONE ENCOUNTER
Pt still having chest pain when NSAIDs wear off; states heart is racing a lot more and that's when it hurts to take a deep breath; coughing up mucus now

## 2020-01-14 NOTE — TELEPHONE ENCOUNTER
Spoke to pt and scheduled Holter monitor for 1/29; advised pt to follow up with PCP for other symptoms; pt accepted appt date and time and expressed understanding

## 2020-01-29 ENCOUNTER — CLINICAL SUPPORT (OUTPATIENT)
Dept: CARDIOLOGY | Facility: CLINIC | Age: 40
End: 2020-01-29
Attending: INTERNAL MEDICINE
Payer: COMMERCIAL

## 2020-01-29 DIAGNOSIS — R00.2 PALPITATIONS: ICD-10-CM

## 2020-01-29 DIAGNOSIS — R94.31 NONSPECIFIC ABNORMAL ELECTROCARDIOGRAM (ECG) (EKG): ICD-10-CM

## 2020-01-29 PROCEDURE — 93224 HOLTER MONITOR - 48 HOUR (CUPID ONLY): ICD-10-PCS | Mod: S$GLB,,, | Performed by: INTERNAL MEDICINE

## 2020-01-29 PROCEDURE — 93224 XTRNL ECG REC UP TO 48 HRS: CPT | Mod: S$GLB,,, | Performed by: INTERNAL MEDICINE

## 2020-02-04 LAB
OHS CV EVENT MONITOR DAY: 0
OHS CV HOLTER LENGTH DECIMAL HOURS: 48
OHS CV HOLTER LENGTH HOURS: 48
OHS CV HOLTER LENGTH MINUTES: 0

## 2020-03-26 ENCOUNTER — OFFICE VISIT (OUTPATIENT)
Dept: PRIMARY CARE CLINIC | Facility: CLINIC | Age: 40
End: 2020-03-26
Payer: COMMERCIAL

## 2020-03-26 ENCOUNTER — NURSE TRIAGE (OUTPATIENT)
Dept: ADMINISTRATIVE | Facility: CLINIC | Age: 40
End: 2020-03-26

## 2020-03-26 VITALS
SYSTOLIC BLOOD PRESSURE: 118 MMHG | OXYGEN SATURATION: 98 % | HEART RATE: 100 BPM | DIASTOLIC BLOOD PRESSURE: 86 MMHG | TEMPERATURE: 99 F

## 2020-03-26 DIAGNOSIS — Z20.822 CLOSE EXPOSURE TO COVID-19 VIRUS: ICD-10-CM

## 2020-03-26 DIAGNOSIS — R50.9 FEVER, UNSPECIFIED FEVER CAUSE: ICD-10-CM

## 2020-03-26 DIAGNOSIS — Z20.822 SUSPECTED COVID-19 VIRUS INFECTION: ICD-10-CM

## 2020-03-26 DIAGNOSIS — R05.9 COUGH: Primary | ICD-10-CM

## 2020-03-26 PROCEDURE — 99203 PR OFFICE/OUTPT VISIT, NEW, LEVL III, 30-44 MIN: ICD-10-PCS | Mod: S$GLB,,, | Performed by: PHYSICAL MEDICINE & REHABILITATION

## 2020-03-26 PROCEDURE — 99203 OFFICE O/P NEW LOW 30 MIN: CPT | Mod: S$GLB,,, | Performed by: PHYSICAL MEDICINE & REHABILITATION

## 2020-03-26 NOTE — PROGRESS NOTES
Today's Date: 03/26/2020     Referring Provider: No ref. provider found     Chief Complaint:   Chief Complaint   Patient presents with    Cough        HPI: Stanley Bancroft Harvey Jr. is a 39 y.o. male  who presents today for evaluation for a 2 day history of fever, headache, back pain, and joint pain.  He has a history of chronic sinus problems.  He recently moved his son out of his college dorm last Sunday.  His son had a fever and fatigue on the drive home.  His son recently tested positive for COVID.  He denies any cough or shortness of breath.  He recently had a test for the flu which was negative.  He denies any chronic heart or lung problems.  No .immune deficiencies.  No recent history of cancer chemotherapy    Review of Systems   Constitutional: Positive for chills, fever and malaise/fatigue.   HENT: Negative for congestion and sinus pain.    Eyes: Negative for double vision and photophobia.   Respiratory: Negative for cough, sputum production, shortness of breath and wheezing.    Cardiovascular: Negative for chest pain and palpitations.   Gastrointestinal: Negative for abdominal pain, diarrhea, nausea and vomiting.   Genitourinary: Negative for dysuria and urgency.   Musculoskeletal: Positive for back pain, joint pain, myalgias and neck pain.   Skin: Negative for itching and rash.   Neurological: Positive for headaches. Negative for dizziness.   Endo/Heme/Allergies: Negative for environmental allergies. Does not bruise/bleed easily.   Psychiatric/Behavioral: Negative.         Social History     Socioeconomic History    Marital status:      Spouse name: Not on file    Number of children: Not on file    Years of education: Not on file    Highest education level: Not on file   Occupational History    Not on file   Social Needs    Financial resource strain: Not on file    Food insecurity:     Worry: Not on file     Inability: Not on file    Transportation needs:     Medical: Not on file      Non-medical: Not on file   Tobacco Use    Smoking status: Former Smoker     Packs/day: 1.00     Years: 11.00     Pack years: 11.00     Types: Cigarettes     Last attempt to quit: 2/28/2017     Years since quitting: 3.0    Smokeless tobacco: Former User     Quit date: 5/21/2015    Tobacco comment: vapouring   Substance and Sexual Activity    Alcohol use: Yes     Alcohol/week: 0.0 standard drinks     Comment: social    Drug use: Not on file    Sexual activity: Not on file   Lifestyle    Physical activity:     Days per week: Not on file     Minutes per session: Not on file    Stress: Not on file   Relationships    Social connections:     Talks on phone: Not on file     Gets together: Not on file     Attends [employer] service: Not on file     Active member of club or organization: Not on file     Attends meetings of clubs or organizations: Not on file     Relationship status: Not on file   Other Topics Concern    Not on file   Social History Narrative    Not on file       Family History   Problem Relation Age of Onset    Diabetes Paternal Grandmother     Kidney failure Paternal Grandmother     Nephrolithiasis Neg Hx        Current Outpatient Medications on File Prior to Visit   Medication Sig Dispense Refill    indomethacin (INDOCIN) 25 MG capsule Take 25 mg by mouth 3 (three) times daily.      sumatriptan (IMITREX) 50 MG tablet Take 1 tablet (50 mg total) by mouth every 2 (two) hours as needed for Migraine. 8 tablet 2     No current facility-administered medications on file prior to visit.         Review of patient's allergies indicates:  No Known Allergies    Past Surgical History:   Procedure Laterality Date    CIRCUMCISION         Past Medical History:   Diagnosis Date    Migraines     Mumps     during childhood, severe    Overweight        Vitals:    03/26/20 1748   BP: 118/86   Pulse: 100   Temp: 99.2 °F (37.3 °C)       Physical Exam   Constitutional: He is oriented to person, place, and time.  He appears well-developed and well-nourished. No distress.   HENT:   Head: Normocephalic and atraumatic.   Eyes: Pupils are equal, round, and reactive to light. EOM are normal.   Cardiovascular: Normal rate, regular rhythm and normal heart sounds.   Pulmonary/Chest: Effort normal. No respiratory distress.   Neurological: He is alert and oriented to person, place, and time.   Skin: Skin is warm and dry.   Psychiatric: He has a normal mood and affect. His behavior is normal. Judgment and thought content normal.      Ortho Exam     Cough  -     Influenza A & B by Molecular    Fever, unspecified fever cause    Close Exposure to Covid-19 Virus    Suspected Covid-19 Virus Infection           PLAN:   Stanley Bancroft Harvey Jr. is a 39 y.o. male with fevers, chills, HA and close contact with COVID + patient(Son).  he was negative for the FLU. SPO2 is 98% on room air. Lungs are CTA b/l. he was counseled on the potential diagnosis.  He does not meet criteria for COVID-19 testing.  As his son is positive and he has had close exposure, we will presume positive at this time.  He is advised to self quarantine until 14 days after their last fever.  They may take tylenol q6 prn for fever/pain. They were advised to avoid NSAIDs for the time being. Conservative care with oral rehydration as needed.  They were briefly counseled on the signs/symptoms of respiratory distress and when to present to the ED.          Buzz Kaplan D.O.          CC: Patients PCP: Vaibhav Funk MD  CC: Referring Provider: No ref. provider found

## 2020-03-26 NOTE — TELEPHONE ENCOUNTER
Pt states his son has tested positive for covid-19 and he has had close contact with him, admits to Weakness;Fever;Severe headache, fever was 103.5 yesterday,  today, provided him with [facility] location to go for testing, advised him to call back with any worsening symptoms, or any concerns, caller agreed

## 2020-04-07 ENCOUNTER — PATIENT MESSAGE (OUTPATIENT)
Dept: FAMILY MEDICINE | Facility: CLINIC | Age: 40
End: 2020-04-07

## 2020-04-08 ENCOUNTER — PATIENT MESSAGE (OUTPATIENT)
Dept: FAMILY MEDICINE | Facility: CLINIC | Age: 40
End: 2020-04-08

## 2020-12-23 ENCOUNTER — PATIENT MESSAGE (OUTPATIENT)
Dept: FAMILY MEDICINE | Facility: CLINIC | Age: 40
End: 2020-12-23

## 2021-04-06 ENCOUNTER — PATIENT MESSAGE (OUTPATIENT)
Dept: ADMINISTRATIVE | Facility: HOSPITAL | Age: 41
End: 2021-04-06

## 2021-05-06 ENCOUNTER — PATIENT MESSAGE (OUTPATIENT)
Dept: RESEARCH | Facility: HOSPITAL | Age: 41
End: 2021-05-06

## 2021-06-04 ENCOUNTER — PATIENT MESSAGE (OUTPATIENT)
Dept: FAMILY MEDICINE | Facility: CLINIC | Age: 41
End: 2021-06-04

## 2021-06-04 ENCOUNTER — OFFICE VISIT (OUTPATIENT)
Dept: FAMILY MEDICINE | Facility: CLINIC | Age: 41
End: 2021-06-04
Payer: COMMERCIAL

## 2021-06-04 VITALS
SYSTOLIC BLOOD PRESSURE: 128 MMHG | BODY MASS INDEX: 38.96 KG/M2 | OXYGEN SATURATION: 97 % | DIASTOLIC BLOOD PRESSURE: 82 MMHG | WEIGHT: 257.06 LBS | HEART RATE: 94 BPM | HEIGHT: 68 IN

## 2021-06-04 DIAGNOSIS — Z00.00 ROUTINE MEDICAL EXAM: Primary | ICD-10-CM

## 2021-06-04 DIAGNOSIS — R53.83 FATIGUE, UNSPECIFIED TYPE: ICD-10-CM

## 2021-06-04 DIAGNOSIS — R06.83 SNORING: ICD-10-CM

## 2021-06-04 DIAGNOSIS — G47.10 HYPERSOMNIA: ICD-10-CM

## 2021-06-04 PROCEDURE — 1126F PR PAIN SEVERITY QUANTIFIED, NO PAIN PRESENT: ICD-10-PCS | Mod: S$GLB,,, | Performed by: FAMILY MEDICINE

## 2021-06-04 PROCEDURE — 1126F AMNT PAIN NOTED NONE PRSNT: CPT | Mod: S$GLB,,, | Performed by: FAMILY MEDICINE

## 2021-06-04 PROCEDURE — 3008F BODY MASS INDEX DOCD: CPT | Mod: CPTII,S$GLB,, | Performed by: FAMILY MEDICINE

## 2021-06-04 PROCEDURE — 99396 PREV VISIT EST AGE 40-64: CPT | Mod: S$GLB,,, | Performed by: FAMILY MEDICINE

## 2021-06-04 PROCEDURE — 3008F PR BODY MASS INDEX (BMI) DOCUMENTED: ICD-10-PCS | Mod: CPTII,S$GLB,, | Performed by: FAMILY MEDICINE

## 2021-06-04 PROCEDURE — 99396 PR PREVENTIVE VISIT,EST,40-64: ICD-10-PCS | Mod: S$GLB,,, | Performed by: FAMILY MEDICINE

## 2021-06-04 PROCEDURE — 99999 PR PBB SHADOW E&M-EST. PATIENT-LVL III: CPT | Mod: PBBFAC,,, | Performed by: FAMILY MEDICINE

## 2021-06-04 PROCEDURE — 99999 PR PBB SHADOW E&M-EST. PATIENT-LVL III: ICD-10-PCS | Mod: PBBFAC,,, | Performed by: FAMILY MEDICINE

## 2021-06-08 ENCOUNTER — PROCEDURE VISIT (OUTPATIENT)
Dept: SLEEP MEDICINE | Facility: HOSPITAL | Age: 41
End: 2021-06-08
Attending: FAMILY MEDICINE
Payer: COMMERCIAL

## 2021-06-08 DIAGNOSIS — G47.10 HYPERSOMNIA: ICD-10-CM

## 2021-06-08 DIAGNOSIS — R06.83 SNORING: ICD-10-CM

## 2021-06-08 DIAGNOSIS — R53.83 FATIGUE, UNSPECIFIED TYPE: ICD-10-CM

## 2021-06-08 PROCEDURE — 95806 SLEEP STUDY UNATT&RESP EFFT: CPT

## 2021-06-24 ENCOUNTER — LAB VISIT (OUTPATIENT)
Dept: LAB | Facility: HOSPITAL | Age: 41
End: 2021-06-24
Attending: FAMILY MEDICINE
Payer: COMMERCIAL

## 2021-06-24 ENCOUNTER — OFFICE VISIT (OUTPATIENT)
Dept: OPTOMETRY | Facility: CLINIC | Age: 41
End: 2021-06-24
Payer: COMMERCIAL

## 2021-06-24 DIAGNOSIS — G51.4 EYELID MYOKYMIA: Primary | ICD-10-CM

## 2021-06-24 DIAGNOSIS — H52.7 REFRACTIVE ERROR: ICD-10-CM

## 2021-06-24 DIAGNOSIS — R53.83 FATIGUE, UNSPECIFIED TYPE: ICD-10-CM

## 2021-06-24 DIAGNOSIS — Z00.00 ROUTINE MEDICAL EXAM: ICD-10-CM

## 2021-06-24 LAB
ALBUMIN SERPL BCP-MCNC: 3.6 G/DL (ref 3.5–5.2)
ALP SERPL-CCNC: 72 U/L (ref 55–135)
ALT SERPL W/O P-5'-P-CCNC: 18 U/L (ref 10–44)
ANION GAP SERPL CALC-SCNC: 8 MMOL/L (ref 8–16)
AST SERPL-CCNC: 18 U/L (ref 10–40)
BASOPHILS # BLD AUTO: 0.03 K/UL (ref 0–0.2)
BASOPHILS NFR BLD: 0.6 % (ref 0–1.9)
BILIRUB SERPL-MCNC: 0.6 MG/DL (ref 0.1–1)
BUN SERPL-MCNC: 11 MG/DL (ref 6–20)
CALCIUM SERPL-MCNC: 9.3 MG/DL (ref 8.7–10.5)
CHLORIDE SERPL-SCNC: 105 MMOL/L (ref 95–110)
CHOLEST SERPL-MCNC: 157 MG/DL (ref 120–199)
CHOLEST/HDLC SERPL: 4.6 {RATIO} (ref 2–5)
CO2 SERPL-SCNC: 27 MMOL/L (ref 23–29)
CREAT SERPL-MCNC: 1 MG/DL (ref 0.5–1.4)
DIFFERENTIAL METHOD: ABNORMAL
EOSINOPHIL # BLD AUTO: 0.1 K/UL (ref 0–0.5)
EOSINOPHIL NFR BLD: 1.3 % (ref 0–8)
ERYTHROCYTE [DISTWIDTH] IN BLOOD BY AUTOMATED COUNT: 13.3 % (ref 11.5–14.5)
EST. GFR  (AFRICAN AMERICAN): >60 ML/MIN/1.73 M^2
EST. GFR  (NON AFRICAN AMERICAN): >60 ML/MIN/1.73 M^2
GLUCOSE SERPL-MCNC: 102 MG/DL (ref 70–110)
HCT VFR BLD AUTO: 43.7 % (ref 40–54)
HDLC SERPL-MCNC: 34 MG/DL (ref 40–75)
HDLC SERPL: 21.7 % (ref 20–50)
HGB BLD-MCNC: 13.9 G/DL (ref 14–18)
IMM GRANULOCYTES # BLD AUTO: 0.01 K/UL (ref 0–0.04)
IMM GRANULOCYTES NFR BLD AUTO: 0.2 % (ref 0–0.5)
LDLC SERPL CALC-MCNC: 107.6 MG/DL (ref 63–159)
LYMPHOCYTES # BLD AUTO: 1.6 K/UL (ref 1–4.8)
LYMPHOCYTES NFR BLD: 35 % (ref 18–48)
MCH RBC QN AUTO: 28.9 PG (ref 27–31)
MCHC RBC AUTO-ENTMCNC: 31.8 G/DL (ref 32–36)
MCV RBC AUTO: 91 FL (ref 82–98)
MONOCYTES # BLD AUTO: 0.5 K/UL (ref 0.3–1)
MONOCYTES NFR BLD: 10.4 % (ref 4–15)
NEUTROPHILS # BLD AUTO: 2.4 K/UL (ref 1.8–7.7)
NEUTROPHILS NFR BLD: 52.5 % (ref 38–73)
NONHDLC SERPL-MCNC: 123 MG/DL
NRBC BLD-RTO: 0 /100 WBC
PLATELET # BLD AUTO: 322 K/UL (ref 150–450)
PLATELET BLD QL SMEAR: ABNORMAL
PMV BLD AUTO: 10.2 FL (ref 9.2–12.9)
POTASSIUM SERPL-SCNC: 3.9 MMOL/L (ref 3.5–5.1)
PROT SERPL-MCNC: 7.1 G/DL (ref 6–8.4)
RBC # BLD AUTO: 4.81 M/UL (ref 4.6–6.2)
SODIUM SERPL-SCNC: 140 MMOL/L (ref 136–145)
TESTOST SERPL-MCNC: 352 NG/DL (ref 304–1227)
TRIGL SERPL-MCNC: 77 MG/DL (ref 30–150)
TSH SERPL DL<=0.005 MIU/L-ACNC: 1 UIU/ML (ref 0.4–4)
WBC # BLD AUTO: 4.63 K/UL (ref 3.9–12.7)

## 2021-06-24 PROCEDURE — 99999 PR PBB SHADOW E&M-EST. PATIENT-LVL II: ICD-10-PCS | Mod: PBBFAC,,, | Performed by: OPTOMETRIST

## 2021-06-24 PROCEDURE — 1126F AMNT PAIN NOTED NONE PRSNT: CPT | Mod: S$GLB,,, | Performed by: OPTOMETRIST

## 2021-06-24 PROCEDURE — 85025 COMPLETE CBC W/AUTO DIFF WBC: CPT | Performed by: FAMILY MEDICINE

## 2021-06-24 PROCEDURE — 80061 LIPID PANEL: CPT | Performed by: FAMILY MEDICINE

## 2021-06-24 PROCEDURE — 84403 ASSAY OF TOTAL TESTOSTERONE: CPT | Performed by: FAMILY MEDICINE

## 2021-06-24 PROCEDURE — 92015 PR REFRACTION: ICD-10-PCS | Mod: S$GLB,,, | Performed by: OPTOMETRIST

## 2021-06-24 PROCEDURE — 84443 ASSAY THYROID STIM HORMONE: CPT | Performed by: FAMILY MEDICINE

## 2021-06-24 PROCEDURE — 36415 COLL VENOUS BLD VENIPUNCTURE: CPT | Mod: PO | Performed by: FAMILY MEDICINE

## 2021-06-24 PROCEDURE — 80053 COMPREHEN METABOLIC PANEL: CPT | Performed by: FAMILY MEDICINE

## 2021-06-24 PROCEDURE — 99999 PR PBB SHADOW E&M-EST. PATIENT-LVL II: CPT | Mod: PBBFAC,,, | Performed by: OPTOMETRIST

## 2021-06-24 PROCEDURE — 92004 COMPRE OPH EXAM NEW PT 1/>: CPT | Mod: S$GLB,,, | Performed by: OPTOMETRIST

## 2021-06-24 PROCEDURE — 1126F PR PAIN SEVERITY QUANTIFIED, NO PAIN PRESENT: ICD-10-PCS | Mod: S$GLB,,, | Performed by: OPTOMETRIST

## 2021-06-24 PROCEDURE — 92015 DETERMINE REFRACTIVE STATE: CPT | Mod: S$GLB,,, | Performed by: OPTOMETRIST

## 2021-06-24 PROCEDURE — 92004 PR EYE EXAM, NEW PATIENT,COMPREHESV: ICD-10-PCS | Mod: S$GLB,,, | Performed by: OPTOMETRIST

## 2021-10-01 NOTE — PROGRESS NOTES
Subjective:    Patient ID:  Stanley Bancroft Harvey Jr. is a 38 y.o. male who presents for evaluation of abnormal EKG    HPI  Recent ekg reported abnormal  He comes with atypical chest pain    Review of Systems   Constitution: Negative for decreased appetite, weakness, malaise/fatigue, weight gain and weight loss.   Cardiovascular: Negative for chest pain, dyspnea on exertion, leg swelling, palpitations and syncope.   Respiratory: Negative for cough and shortness of breath.    Gastrointestinal: Negative.    All other systems reviewed and are negative.       Objective:      Physical Exam   Constitutional: He is oriented to person, place, and time. He appears well-developed and well-nourished.   HENT:   Head: Normocephalic.   Eyes: Pupils are equal, round, and reactive to light.   Neck: Normal range of motion. Neck supple. No JVD present. Carotid bruit is not present. No thyromegaly present.   Cardiovascular: Normal rate, regular rhythm, normal heart sounds, intact distal pulses and normal pulses. PMI is not displaced. Exam reveals no gallop.   No murmur heard.  Pulmonary/Chest: Effort normal and breath sounds normal.   Abdominal: Soft. Normal appearance. He exhibits no mass. There is no hepatosplenomegaly. There is no tenderness.   Musculoskeletal: Normal range of motion. He exhibits no edema.   Neurological: He is alert and oriented to person, place, and time. He has normal strength and normal reflexes. No sensory deficit.   Skin: Skin is warm and intact.   Psychiatric: He has a normal mood and affect.   Nursing note and vitals reviewed.        Assessment:       1. Nonspecific abnormal electrocardiogram (ECG) (EKG)         Plan:     Stress echo  Call with results        
Yes

## 2021-12-03 ENCOUNTER — OFFICE VISIT (OUTPATIENT)
Dept: FAMILY MEDICINE | Facility: CLINIC | Age: 41
End: 2021-12-03
Payer: COMMERCIAL

## 2021-12-03 VITALS
BODY MASS INDEX: 37.59 KG/M2 | DIASTOLIC BLOOD PRESSURE: 82 MMHG | SYSTOLIC BLOOD PRESSURE: 130 MMHG | HEIGHT: 68 IN | WEIGHT: 248 LBS | HEART RATE: 82 BPM | OXYGEN SATURATION: 95 %

## 2021-12-03 DIAGNOSIS — E66.01 SEVERE OBESITY (BMI 35.0-39.9) WITH COMORBIDITY: ICD-10-CM

## 2021-12-03 DIAGNOSIS — G47.33 OSA (OBSTRUCTIVE SLEEP APNEA): Primary | ICD-10-CM

## 2021-12-03 DIAGNOSIS — J31.0 CHRONIC RHINITIS: ICD-10-CM

## 2021-12-03 PROCEDURE — 99999 PR PBB SHADOW E&M-EST. PATIENT-LVL III: ICD-10-PCS | Mod: PBBFAC,,, | Performed by: FAMILY MEDICINE

## 2021-12-03 PROCEDURE — 99999 PR PBB SHADOW E&M-EST. PATIENT-LVL III: CPT | Mod: PBBFAC,,, | Performed by: FAMILY MEDICINE

## 2021-12-03 PROCEDURE — 99214 PR OFFICE/OUTPT VISIT, EST, LEVL IV, 30-39 MIN: ICD-10-PCS | Mod: S$GLB,,, | Performed by: FAMILY MEDICINE

## 2021-12-03 PROCEDURE — 99214 OFFICE O/P EST MOD 30 MIN: CPT | Mod: S$GLB,,, | Performed by: FAMILY MEDICINE

## 2022-01-18 ENCOUNTER — LAB VISIT (OUTPATIENT)
Dept: LAB | Facility: HOSPITAL | Age: 42
End: 2022-01-18
Attending: ALLERGY & IMMUNOLOGY
Payer: COMMERCIAL

## 2022-01-18 ENCOUNTER — OFFICE VISIT (OUTPATIENT)
Dept: ALLERGY | Facility: CLINIC | Age: 42
End: 2022-01-18
Payer: COMMERCIAL

## 2022-01-18 VITALS — BODY MASS INDEX: 38.76 KG/M2 | WEIGHT: 255.75 LBS | HEIGHT: 68 IN

## 2022-01-18 DIAGNOSIS — J31.0 CHRONIC RHINITIS: ICD-10-CM

## 2022-01-18 DIAGNOSIS — H10.423 SIMPLE CHRONIC CONJUNCTIVITIS OF BOTH EYES: Primary | ICD-10-CM

## 2022-01-18 DIAGNOSIS — H10.423 SIMPLE CHRONIC CONJUNCTIVITIS OF BOTH EYES: ICD-10-CM

## 2022-01-18 PROCEDURE — 1159F MED LIST DOCD IN RCRD: CPT | Mod: CPTII,S$GLB,, | Performed by: ALLERGY & IMMUNOLOGY

## 2022-01-18 PROCEDURE — 99999 PR PBB SHADOW E&M-EST. PATIENT-LVL III: ICD-10-PCS | Mod: PBBFAC,,, | Performed by: ALLERGY & IMMUNOLOGY

## 2022-01-18 PROCEDURE — 1159F PR MEDICATION LIST DOCUMENTED IN MEDICAL RECORD: ICD-10-PCS | Mod: CPTII,S$GLB,, | Performed by: ALLERGY & IMMUNOLOGY

## 2022-01-18 PROCEDURE — 1160F RVW MEDS BY RX/DR IN RCRD: CPT | Mod: CPTII,S$GLB,, | Performed by: ALLERGY & IMMUNOLOGY

## 2022-01-18 PROCEDURE — 86003 ALLG SPEC IGE CRUDE XTRC EA: CPT | Mod: 59 | Performed by: ALLERGY & IMMUNOLOGY

## 2022-01-18 PROCEDURE — 86003 ALLG SPEC IGE CRUDE XTRC EA: CPT | Performed by: ALLERGY & IMMUNOLOGY

## 2022-01-18 PROCEDURE — 99204 PR OFFICE/OUTPT VISIT, NEW, LEVL IV, 45-59 MIN: ICD-10-PCS | Mod: S$GLB,,, | Performed by: ALLERGY & IMMUNOLOGY

## 2022-01-18 PROCEDURE — 99204 OFFICE O/P NEW MOD 45 MIN: CPT | Mod: S$GLB,,, | Performed by: ALLERGY & IMMUNOLOGY

## 2022-01-18 PROCEDURE — 1160F PR REVIEW ALL MEDS BY PRESCRIBER/CLIN PHARMACIST DOCUMENTED: ICD-10-PCS | Mod: CPTII,S$GLB,, | Performed by: ALLERGY & IMMUNOLOGY

## 2022-01-18 PROCEDURE — 36415 COLL VENOUS BLD VENIPUNCTURE: CPT | Mod: PO | Performed by: ALLERGY & IMMUNOLOGY

## 2022-01-18 PROCEDURE — 3008F BODY MASS INDEX DOCD: CPT | Mod: CPTII,S$GLB,, | Performed by: ALLERGY & IMMUNOLOGY

## 2022-01-18 PROCEDURE — 3008F PR BODY MASS INDEX (BMI) DOCUMENTED: ICD-10-PCS | Mod: CPTII,S$GLB,, | Performed by: ALLERGY & IMMUNOLOGY

## 2022-01-18 PROCEDURE — 99999 PR PBB SHADOW E&M-EST. PATIENT-LVL III: CPT | Mod: PBBFAC,,, | Performed by: ALLERGY & IMMUNOLOGY

## 2022-01-18 RX ORDER — LORATADINE 10 MG/1
10 TABLET ORAL DAILY
COMMUNITY

## 2022-01-18 RX ORDER — CETIRIZINE HYDROCHLORIDE 10 MG/1
10 TABLET ORAL DAILY
COMMUNITY

## 2022-01-18 RX ORDER — IBUPROFEN, PSEUDOEPHEDRINE HYDROCHLORIDE 200; 30 MG/1; MG/1
CAPSULE, LIQUID FILLED ORAL
COMMUNITY

## 2022-01-18 RX ORDER — DIPHENHYDRAMINE HCL 25 MG
25 CAPSULE ORAL EVERY 6 HOURS PRN
COMMUNITY

## 2022-01-18 NOTE — PROGRESS NOTES
Subjective:       Patient ID: Stanley Bancroft Harvey Jr. is a 41 y.o. male.    Chief Complaint:  Allergies (Itchy watery burning eyes, itchy ears, sneezing, stuffy nose, runny nose, PND,), Urticaria (Skin will start to get red and itchy and the hives break out and they are on his arms and dont last long), and Sinusitis (Pt. Reports getting 2-4 sinus infections a year, facial pressure and pain, headaches, PND , if he does not get started with meds he will get a sore throat)      40 yo man presents for consult from Dr Vaibhav Funk for allergies. He states for years he has constant sneezing. Will sneeze in fits all day long. At times has stuffy nose, runny nose, PND< itchy burning watery eyes. No wheeze, once had chest pain but work up negative and not recurred. No H/o asthma. Has issues all year, no season worse. Worse in AM but has all day. No known triggers. Takes antihistamines daily with some relief. Gets 2-4 sinus infections per year often needing antibiotics. He also gets random hives on arms where arm itches intensely then has red hives, go and come quickly. No known food, insect or latex allergy. No eczema. No ENT surgery. No other medical issues.       Environmental History: see history section for home environment  Review of Systems   Constitutional: Negative for activity change, appetite change, chills, fatigue, fever and unexpected weight change.   HENT: Positive for congestion, postnasal drip, rhinorrhea, sinus pressure, sneezing and sore throat. Negative for ear discharge, ear pain, facial swelling, hearing loss, mouth sores, nosebleeds, tinnitus, trouble swallowing and voice change.    Eyes: Positive for discharge and itching. Negative for redness and visual disturbance.   Respiratory: Negative for cough, chest tightness, shortness of breath and wheezing.    Cardiovascular: Negative for chest pain, palpitations and leg swelling.   Gastrointestinal: Negative for abdominal distention, abdominal pain,  constipation, diarrhea, nausea and vomiting.   Genitourinary: Negative for difficulty urinating.   Musculoskeletal: Negative for arthralgias, back pain, joint swelling and myalgias.   Skin: Positive for rash. Negative for color change and pallor.   Neurological: Negative for dizziness, tremors, speech difficulty, weakness, light-headedness and headaches.   Hematological: Negative for adenopathy. Does not bruise/bleed easily.   Psychiatric/Behavioral: Negative for agitation, confusion, decreased concentration and sleep disturbance. The patient is not nervous/anxious.         Objective:      Physical Exam  Vitals and nursing note reviewed.   Constitutional:       General: He is not in acute distress.     Appearance: Normal appearance. He is well-developed and well-nourished. He is not ill-appearing.   HENT:      Head: Normocephalic and atraumatic.      Right Ear: Hearing and external ear normal.      Left Ear: Hearing and external ear normal.      Nose: No septal deviation, sinus tenderness, mucosal edema (pink turbinates), rhinorrhea or epistaxis.      Mouth/Throat:      Mouth: Oropharynx is clear and moist and mucous membranes are normal.      Pharynx: No uvula swelling.   Eyes:      General:         Right eye: No discharge.         Left eye: No discharge.      Conjunctiva/sclera: Conjunctivae normal.   Neck:      Thyroid: No thyromegaly.   Pulmonary:      Effort: Pulmonary effort is normal. No respiratory distress.   Abdominal:      General: There is no distension.   Musculoskeletal:         General: No edema. Normal range of motion.      Cervical back: Normal range of motion.   Skin:     General: Skin is warm and dry.      Findings: No erythema or rash.   Neurological:      Mental Status: He is alert and oriented to person, place, and time.      Coordination: Coordination normal.   Psychiatric:         Mood and Affect: Mood and affect normal.         Behavior: Behavior normal.         Thought Content: Thought  content normal.         Judgment: Judgment normal.         Laboratory:   none performed   Assessment:       1. Simple chronic conjunctivitis of both eyes    2. Chronic rhinitis         Plan:       1. advised pt his nasal symptoms can be allergic rhinitis vs chronic non allergic rhinitis vs sinus issue, will send immunocaps to eval  2. Advised hives likely from allergy as are not daily and random, continue daily H 1 blocker  3. Phone review  4. Dr Funk notified of completed consult via [email or web address]

## 2022-01-21 LAB
A ALTERNATA IGE QN: <0.1 KU/L
A FUMIGATUS IGE QN: 0.63 KU/L
ALLERGEN CHAETOMIUM GLOBOSUM IGE: <0.1 KU/L
ALLERGEN WALNUT TREE IGE: <0.1 KU/L
ALLERGEN WHITE PINE TREE IGE: <0.1 KU/L
BAHIA GRASS IGE QN: <0.1 KU/L
BALD CYPRESS IGE QN: <0.1 KU/L
BERMUDA GRASS IGE QN: <0.1 KU/L
C HERBARUM IGE QN: <0.1 KU/L
C LUNATA IGE QN: <0.1 KU/L
CAT DANDER IGE QN: <0.1 KU/L
CHAETOMIUM GLOB. CLASS: NORMAL
COMMON RAGWEED IGE QN: 0.11 KU/L
COTTONWOOD IGE QN: <0.1 KU/L
D FARINAE IGE QN: 33 KU/L
D PTERONYSS IGE QN: 42.7 KU/L
DEPRECATED A ALTERNATA IGE RAST QL: NORMAL
DEPRECATED A FUMIGATUS IGE RAST QL: ABNORMAL
DEPRECATED BAHIA GRASS IGE RAST QL: NORMAL
DEPRECATED BALD CYPRESS IGE RAST QL: NORMAL
DEPRECATED BERMUDA GRASS IGE RAST QL: NORMAL
DEPRECATED C HERBARUM IGE RAST QL: NORMAL
DEPRECATED C LUNATA IGE RAST QL: NORMAL
DEPRECATED CAT DANDER IGE RAST QL: NORMAL
DEPRECATED COMMON RAGWEED IGE RAST QL: ABNORMAL
DEPRECATED COTTONWOOD IGE RAST QL: NORMAL
DEPRECATED D FARINAE IGE RAST QL: ABNORMAL
DEPRECATED D PTERONYSS IGE RAST QL: ABNORMAL
DEPRECATED DOG DANDER IGE RAST QL: NORMAL
DEPRECATED ELDER IGE RAST QL: NORMAL
DEPRECATED ENGL PLANTAIN IGE RAST QL: NORMAL
DEPRECATED HORSE DANDER IGE RAST QL: NORMAL
DEPRECATED JOHNSON GRASS IGE RAST QL: NORMAL
DEPRECATED LONDON PLANE IGE RAST QL: NORMAL
DEPRECATED MUGWORT IGE RAST QL: NORMAL
DEPRECATED P NOTATUM IGE RAST QL: NORMAL
DEPRECATED PECAN/HICK TREE IGE RAST QL: NORMAL
DEPRECATED ROACH IGE RAST QL: ABNORMAL
DEPRECATED S ROSTRATA IGE RAST QL: NORMAL
DEPRECATED SALTWORT IGE RAST QL: NORMAL
DEPRECATED SILVER BIRCH IGE RAST QL: NORMAL
DEPRECATED TIMOTHY IGE RAST QL: NORMAL
DEPRECATED WEST RAGWEED IGE RAST QL: NORMAL
DEPRECATED WHITE OAK IGE RAST QL: NORMAL
DEPRECATED WILLOW IGE RAST QL: NORMAL
DOG DANDER IGE QN: <0.1 KU/L
ELDER IGE QN: <0.1 KU/L
ENGL PLANTAIN IGE QN: <0.1 KU/L
HORSE DANDER IGE QN: <0.1 KU/L
JOHNSON GRASS IGE QN: <0.1 KU/L
LONDON PLANE IGE QN: <0.1 KU/L
MUGWORT IGE QN: <0.1 KU/L
P NOTATUM IGE QN: <0.1 KU/L
PECAN/HICK TREE IGE QN: <0.1 KU/L
ROACH IGE QN: 0.35 KU/L
S ROSTRATA IGE QN: <0.1 KU/L
SALTWORT IGE QN: <0.1 KU/L
SILVER BIRCH IGE QN: <0.1 KU/L
TIMOTHY IGE QN: <0.1 KU/L
WALNUT TREE CLASS: NORMAL
WEST RAGWEED IGE QN: <0.1 KU/L
WHITE OAK IGE QN: <0.1 KU/L
WHITE PINE CLASS: NORMAL
WILLOW IGE QN: <0.1 KU/L

## 2022-01-31 ENCOUNTER — PATIENT MESSAGE (OUTPATIENT)
Dept: ALLERGY | Facility: CLINIC | Age: 42
End: 2022-01-31
Payer: COMMERCIAL

## 2022-05-10 ENCOUNTER — TELEPHONE (OUTPATIENT)
Dept: FAMILY MEDICINE | Facility: CLINIC | Age: 42
End: 2022-05-10
Payer: COMMERCIAL

## 2022-05-11 ENCOUNTER — TELEPHONE (OUTPATIENT)
Dept: FAMILY MEDICINE | Facility: CLINIC | Age: 42
End: 2022-05-11
Payer: COMMERCIAL

## 2022-05-16 ENCOUNTER — TELEPHONE (OUTPATIENT)
Dept: FAMILY MEDICINE | Facility: CLINIC | Age: 42
End: 2022-05-16
Payer: COMMERCIAL

## 2022-05-18 ENCOUNTER — TELEPHONE (OUTPATIENT)
Dept: FAMILY MEDICINE | Facility: CLINIC | Age: 42
End: 2022-05-18
Payer: COMMERCIAL

## 2022-05-18 NOTE — TELEPHONE ENCOUNTER
LVM message for pt about his appt needing to be r/s. I have r/s him for 6/14 at 8:40 am. I have sent him a message as well with this information. This was the 3 rd time we have tried to contact him.

## 2023-02-06 NOTE — MR AVS SNAPSHOT
"    [facility]  [address] [facility] [address]  [place] [address]  Phone: [phone]                  Stanley Bancroft Harvey Jr.   2017 8:30 AM   Office Visit    Description:  Male : 1980   Provider:  TERRENCE Salinas MD   Department:  [facility]           Reason for Visit     Low Testosterone                To Do List           Goals (5 Years of Data)     None      [facility]     [facility] Care Line -  Assistance  Unless otherwise directed by your provider, please contact [facility], our nurse care line that is available for  assistance.     Registered nurses in the [facility] On Call Center provide: appointment scheduling, clinical advisement, health education, and other advisory services.  Call: [phone] (toll free)               Medications           STOP taking these medications     hydrocodone-acetaminophen 7.5-325mg (NORCO) 7.5-325 mg per tablet 1 Q 6 H PRF BREAKTHRU PAIN ONLY    mupirocin (BACTROBAN) 2 % ointment APPLY BID FOR 7 TO 10 DAYS           Verify that the below list of medications is an accurate representation of the medications you are currently taking.  If none reported, the list may be blank. If incorrect, please contact your healthcare provider. Carry this list with you in case of emergency.           Current Medications            Clinical Reference Information           Your Vitals Were     BP Pulse Height Weight BMI    138/76 (BP Location: Left arm) 66 5' 8" (1.727 m) 114 kg (251 lb 5.2 oz) 38.21 kg/m2      Blood Pressure          Most Recent Value    BP  138/76      Allergies as of 2017     No Known Allergies      Immunizations Administered on Date of Encounter - 2017     None      Smoking Cessation     If you would like to quit smoking:   You may be eligible for free services if you are a [place] resident and started smoking cigarettes before 1988.  Call the [employer] ([employer]) toll free at " [facility]    Phone Message    May a detailed message be left on voicemail: yes     Reason for Call: Other: Jacqueline, contact specialist, calling from [facility] stating they have faxed over orders for Physical therapy initial evaluation, once on 01/19/23 and again on 02/03/23. They are still awaiting those signed forms back.  I reccommended to him to re fax the orders today.    Action Taken: Message routed to:  [facility]    Travel Screening: Not Applicable                                                                       [phone] or [phone].   Call 1-800-QUIT-NOW if you do not meet the above criteria.   Contact us via email: [email or web address]   View our website for more information: [email or web address]        Language Assistance Services     ATTENTION: Language assistance services are available, free of charge. Please call [phone].      ATENCIÓN: Si habla español, tiene a nagy disposición servicios gratuitos de asistencia lingüística. Llame al [phone].     CHÚ Ý: N?u b?n nói Ti?ng Vi?t, có các d?ch v? h? tr? ngôn ng? mi?n phí dành cho b?n. G?i s? [phone].         [facility] complies with applicable Federal civil rights laws and does not discriminate on the basis of race, color, national origin, age, disability, or sex.

## 2023-03-31 ENCOUNTER — OFFICE VISIT (OUTPATIENT)
Dept: FAMILY MEDICINE | Facility: CLINIC | Age: 43
End: 2023-03-31
Payer: COMMERCIAL

## 2023-03-31 VITALS
HEART RATE: 74 BPM | HEIGHT: 68 IN | WEIGHT: 242.06 LBS | SYSTOLIC BLOOD PRESSURE: 132 MMHG | OXYGEN SATURATION: 98 % | BODY MASS INDEX: 36.69 KG/M2 | DIASTOLIC BLOOD PRESSURE: 78 MMHG | RESPIRATION RATE: 18 BRPM | TEMPERATURE: 98 F

## 2023-03-31 DIAGNOSIS — R53.83 FATIGUE, UNSPECIFIED TYPE: ICD-10-CM

## 2023-03-31 DIAGNOSIS — Z00.00 ROUTINE MEDICAL EXAM: Primary | ICD-10-CM

## 2023-03-31 DIAGNOSIS — G47.33 OSA (OBSTRUCTIVE SLEEP APNEA): ICD-10-CM

## 2023-03-31 DIAGNOSIS — E66.01 SEVERE OBESITY (BMI 35.0-39.9) WITH COMORBIDITY: ICD-10-CM

## 2023-03-31 DIAGNOSIS — Z13.1 DIABETES MELLITUS SCREENING: ICD-10-CM

## 2023-03-31 PROCEDURE — 1159F PR MEDICATION LIST DOCUMENTED IN MEDICAL RECORD: ICD-10-PCS | Mod: CPTII,S$GLB,, | Performed by: FAMILY MEDICINE

## 2023-03-31 PROCEDURE — 99999 PR PBB SHADOW E&M-EST. PATIENT-LVL IV: CPT | Mod: PBBFAC,,, | Performed by: FAMILY MEDICINE

## 2023-03-31 PROCEDURE — 3078F DIAST BP <80 MM HG: CPT | Mod: CPTII,S$GLB,, | Performed by: FAMILY MEDICINE

## 2023-03-31 PROCEDURE — 99396 PREV VISIT EST AGE 40-64: CPT | Mod: S$GLB,,, | Performed by: FAMILY MEDICINE

## 2023-03-31 PROCEDURE — 3075F PR MOST RECENT SYSTOLIC BLOOD PRESS GE 130-139MM HG: ICD-10-PCS | Mod: CPTII,S$GLB,, | Performed by: FAMILY MEDICINE

## 2023-03-31 PROCEDURE — 3075F SYST BP GE 130 - 139MM HG: CPT | Mod: CPTII,S$GLB,, | Performed by: FAMILY MEDICINE

## 2023-03-31 PROCEDURE — 3008F PR BODY MASS INDEX (BMI) DOCUMENTED: ICD-10-PCS | Mod: CPTII,S$GLB,, | Performed by: FAMILY MEDICINE

## 2023-03-31 PROCEDURE — 1159F MED LIST DOCD IN RCRD: CPT | Mod: CPTII,S$GLB,, | Performed by: FAMILY MEDICINE

## 2023-03-31 PROCEDURE — 3078F PR MOST RECENT DIASTOLIC BLOOD PRESSURE < 80 MM HG: ICD-10-PCS | Mod: CPTII,S$GLB,, | Performed by: FAMILY MEDICINE

## 2023-03-31 PROCEDURE — 99999 PR PBB SHADOW E&M-EST. PATIENT-LVL IV: ICD-10-PCS | Mod: PBBFAC,,, | Performed by: FAMILY MEDICINE

## 2023-03-31 PROCEDURE — 99396 PR PREVENTIVE VISIT,EST,40-64: ICD-10-PCS | Mod: S$GLB,,, | Performed by: FAMILY MEDICINE

## 2023-03-31 PROCEDURE — 3008F BODY MASS INDEX DOCD: CPT | Mod: CPTII,S$GLB,, | Performed by: FAMILY MEDICINE

## 2023-03-31 PROCEDURE — 1160F RVW MEDS BY RX/DR IN RCRD: CPT | Mod: CPTII,S$GLB,, | Performed by: FAMILY MEDICINE

## 2023-03-31 PROCEDURE — 1160F PR REVIEW ALL MEDS BY PRESCRIBER/CLIN PHARMACIST DOCUMENTED: ICD-10-PCS | Mod: CPTII,S$GLB,, | Performed by: FAMILY MEDICINE

## 2023-03-31 NOTE — PROGRESS NOTES
Subjective:       Patient ID: Stanley Bancroft Harvey Jr. is a 42 y.o. male.    Chief Complaint: Annual Exam    HPI    Here for a check up    Reports fatigue. Had sleep study in 2021 at [facility] but did not go back for cpap trial.  Hx of low normal testosterone.       Review of Systems        Review of Systems   Constitutional: Negative for fever and chills.   HENT: Negative for hearing loss and neck stiffness.    Eyes: Negative for redness and itching.   Respiratory: Negative for cough and choking.    Cardiovascular: Negative for chest pain and leg swelling.  Abdomen: Negative for abdominal pain and blood in stool.   Genitourinary: Negative for dysuria and flank pain.   Musculoskeletal: Negative for back pain and gait problem.   Neurological: Negative for light-headedness and headaches.   Hematological: Negative for adenopathy.   Psychiatric/Behavioral: Negative for behavioral problems.     Objective:      Physical Exam  Constitutional:       Appearance: He is well-developed.   HENT:      Head: Normocephalic and atraumatic.   Eyes:      Conjunctiva/sclera: Conjunctivae normal.      Pupils: Pupils are equal, round, and reactive to light.   Cardiovascular:      Rate and Rhythm: Normal rate and regular rhythm.      Heart sounds: Normal heart sounds. No murmur heard.    No friction rub.   Pulmonary:      Effort: Pulmonary effort is normal.      Breath sounds: Normal breath sounds.   Abdominal:      General: Bowel sounds are normal.      Palpations: Abdomen is soft.      Tenderness: There is no abdominal tenderness.   Musculoskeletal:         General: Normal range of motion.      Cervical back: Normal range of motion and neck supple.   Lymphadenopathy:      Cervical: No cervical adenopathy.   Skin:     General: Skin is warm and dry.   Neurological:      Mental Status: He is alert and oriented to person, place, and time.      Cranial Nerves: No cranial nerve deficit.      Coordination: Coordination normal.      Deep Tendon  Reflexes: Reflexes are normal and symmetric.   Psychiatric:         Behavior: Behavior normal.       Assessment:       1. Routine medical exam    2. Severe obesity (BMI 35.0-39.9) with comorbidity    3. Diabetes mellitus screening    4. Fatigue, unspecified type    5. HAILEY (obstructive sleep apnea)          Plan:       Routine medical exam  -     Comprehensive Metabolic Panel; Future  -     Lipid Panel; Future  -     CBC Auto Differential; Future    Severe obesity (BMI 35.0-39.9) with comorbidity    Diabetes mellitus screening  -     Hemoglobin A1C; Future    Fatigue, unspecified type  -     Testosterone; Future; Expected date: 03/31/2023    HAILEY (obstructive sleep apnea)          Plan:  See orders  Recommend patient call [facility] to get scheduled for cpap trial.   F/u in 1 year          Medication List with Changes/Refills   Current Medications    CETIRIZINE (ZYRTEC) 10 MG TABLET    Take 10 mg by mouth once daily.    DIPHENHYDRAMINE (BENADRYL) 25 MG CAPSULE    Take 25 mg by mouth every 6 (six) hours as needed for Itching.    LORATADINE (CLARITIN) 10 MG TABLET    Take 10 mg by mouth once daily.    PSEUDOEPHEDRINE-IBUPROFEN (ADVIL COLD AND SINUS)  MG CAP    Take by mouth.

## 2023-04-12 ENCOUNTER — LAB VISIT (OUTPATIENT)
Dept: LAB | Facility: HOSPITAL | Age: 43
End: 2023-04-12
Attending: FAMILY MEDICINE
Payer: COMMERCIAL

## 2023-04-12 DIAGNOSIS — Z00.00 ROUTINE MEDICAL EXAM: ICD-10-CM

## 2023-04-12 DIAGNOSIS — Z13.1 DIABETES MELLITUS SCREENING: ICD-10-CM

## 2023-04-12 DIAGNOSIS — R53.83 FATIGUE, UNSPECIFIED TYPE: ICD-10-CM

## 2023-04-12 LAB
ALBUMIN SERPL BCP-MCNC: 3.7 G/DL (ref 3.5–5.2)
ALP SERPL-CCNC: 76 U/L (ref 55–135)
ALT SERPL W/O P-5'-P-CCNC: 19 U/L (ref 10–44)
ANION GAP SERPL CALC-SCNC: 9 MMOL/L (ref 8–16)
AST SERPL-CCNC: 19 U/L (ref 10–40)
BASOPHILS # BLD AUTO: 0.03 K/UL (ref 0–0.2)
BASOPHILS NFR BLD: 0.7 % (ref 0–1.9)
BILIRUB SERPL-MCNC: 0.5 MG/DL (ref 0.1–1)
BUN SERPL-MCNC: 11 MG/DL (ref 6–20)
CALCIUM SERPL-MCNC: 9.6 MG/DL (ref 8.7–10.5)
CHLORIDE SERPL-SCNC: 103 MMOL/L (ref 95–110)
CHOLEST SERPL-MCNC: 157 MG/DL (ref 120–199)
CHOLEST/HDLC SERPL: 4.8 {RATIO} (ref 2–5)
CO2 SERPL-SCNC: 28 MMOL/L (ref 23–29)
CREAT SERPL-MCNC: 1 MG/DL (ref 0.5–1.4)
DIFFERENTIAL METHOD: NORMAL
EOSINOPHIL # BLD AUTO: 0.1 K/UL (ref 0–0.5)
EOSINOPHIL NFR BLD: 1.7 % (ref 0–8)
ERYTHROCYTE [DISTWIDTH] IN BLOOD BY AUTOMATED COUNT: 12.4 % (ref 11.5–14.5)
EST. GFR  (NO RACE VARIABLE): >60 ML/MIN/1.73 M^2
ESTIMATED AVG GLUCOSE: 111 MG/DL (ref 68–131)
GLUCOSE SERPL-MCNC: 100 MG/DL (ref 70–110)
HBA1C MFR BLD: 5.5 % (ref 4–5.6)
HCT VFR BLD AUTO: 43.8 % (ref 40–54)
HDLC SERPL-MCNC: 33 MG/DL (ref 40–75)
HDLC SERPL: 21 % (ref 20–50)
HGB BLD-MCNC: 14.1 G/DL (ref 14–18)
IMM GRANULOCYTES # BLD AUTO: 0.02 K/UL (ref 0–0.04)
IMM GRANULOCYTES NFR BLD AUTO: 0.5 % (ref 0–0.5)
LDLC SERPL CALC-MCNC: 105.6 MG/DL (ref 63–159)
LYMPHOCYTES # BLD AUTO: 1.3 K/UL (ref 1–4.8)
LYMPHOCYTES NFR BLD: 30.2 % (ref 18–48)
MCH RBC QN AUTO: 29.1 PG (ref 27–31)
MCHC RBC AUTO-ENTMCNC: 32.2 G/DL (ref 32–36)
MCV RBC AUTO: 91 FL (ref 82–98)
MONOCYTES # BLD AUTO: 0.4 K/UL (ref 0.3–1)
MONOCYTES NFR BLD: 9.6 % (ref 4–15)
NEUTROPHILS # BLD AUTO: 2.4 K/UL (ref 1.8–7.7)
NEUTROPHILS NFR BLD: 57.3 % (ref 38–73)
NONHDLC SERPL-MCNC: 124 MG/DL
NRBC BLD-RTO: 0 /100 WBC
PLATELET # BLD AUTO: 327 K/UL (ref 150–450)
PMV BLD AUTO: 9.9 FL (ref 9.2–12.9)
POTASSIUM SERPL-SCNC: 4.2 MMOL/L (ref 3.5–5.1)
PROT SERPL-MCNC: 7.2 G/DL (ref 6–8.4)
RBC # BLD AUTO: 4.84 M/UL (ref 4.6–6.2)
SODIUM SERPL-SCNC: 140 MMOL/L (ref 136–145)
TESTOST SERPL-MCNC: 390 NG/DL (ref 304–1227)
TRIGL SERPL-MCNC: 92 MG/DL (ref 30–150)
WBC # BLD AUTO: 4.17 K/UL (ref 3.9–12.7)

## 2023-04-12 PROCEDURE — 36415 COLL VENOUS BLD VENIPUNCTURE: CPT | Mod: PO | Performed by: FAMILY MEDICINE

## 2023-04-12 PROCEDURE — 84403 ASSAY OF TOTAL TESTOSTERONE: CPT | Performed by: FAMILY MEDICINE

## 2023-04-12 PROCEDURE — 80061 LIPID PANEL: CPT | Performed by: FAMILY MEDICINE

## 2023-04-12 PROCEDURE — 85025 COMPLETE CBC W/AUTO DIFF WBC: CPT | Performed by: FAMILY MEDICINE

## 2023-04-12 PROCEDURE — 80053 COMPREHEN METABOLIC PANEL: CPT | Performed by: FAMILY MEDICINE

## 2023-04-12 PROCEDURE — 83036 HEMOGLOBIN GLYCOSYLATED A1C: CPT | Performed by: FAMILY MEDICINE

## 2023-08-09 ENCOUNTER — PATIENT MESSAGE (OUTPATIENT)
Dept: ADMINISTRATIVE | Facility: HOSPITAL | Age: 43
End: 2023-08-09
Payer: COMMERCIAL

## 2023-08-31 ENCOUNTER — TELEPHONE (OUTPATIENT)
Dept: FAMILY MEDICINE | Facility: CLINIC | Age: 43
End: 2023-08-31
Payer: COMMERCIAL

## 2023-09-01 ENCOUNTER — OFFICE VISIT (OUTPATIENT)
Dept: FAMILY MEDICINE | Facility: CLINIC | Age: 43
End: 2023-09-01
Payer: COMMERCIAL

## 2023-09-01 VITALS
HEIGHT: 68 IN | BODY MASS INDEX: 37.82 KG/M2 | DIASTOLIC BLOOD PRESSURE: 78 MMHG | HEART RATE: 76 BPM | OXYGEN SATURATION: 99 % | WEIGHT: 249.56 LBS | SYSTOLIC BLOOD PRESSURE: 126 MMHG

## 2023-09-01 DIAGNOSIS — M54.42 ACUTE BILATERAL LOW BACK PAIN WITH LEFT-SIDED SCIATICA: Primary | ICD-10-CM

## 2023-09-01 PROCEDURE — 1159F PR MEDICATION LIST DOCUMENTED IN MEDICAL RECORD: ICD-10-PCS | Mod: CPTII,S$GLB,, | Performed by: INTERNAL MEDICINE

## 2023-09-01 PROCEDURE — 3044F PR MOST RECENT HEMOGLOBIN A1C LEVEL <7.0%: ICD-10-PCS | Mod: CPTII,S$GLB,, | Performed by: INTERNAL MEDICINE

## 2023-09-01 PROCEDURE — 99203 PR OFFICE/OUTPT VISIT, NEW, LEVL III, 30-44 MIN: ICD-10-PCS | Mod: S$GLB,,, | Performed by: INTERNAL MEDICINE

## 2023-09-01 PROCEDURE — 99203 OFFICE O/P NEW LOW 30 MIN: CPT | Mod: S$GLB,,, | Performed by: INTERNAL MEDICINE

## 2023-09-01 PROCEDURE — 1159F MED LIST DOCD IN RCRD: CPT | Mod: CPTII,S$GLB,, | Performed by: INTERNAL MEDICINE

## 2023-09-01 PROCEDURE — 3008F BODY MASS INDEX DOCD: CPT | Mod: CPTII,S$GLB,, | Performed by: INTERNAL MEDICINE

## 2023-09-01 PROCEDURE — 3044F HG A1C LEVEL LT 7.0%: CPT | Mod: CPTII,S$GLB,, | Performed by: INTERNAL MEDICINE

## 2023-09-01 PROCEDURE — 1160F RVW MEDS BY RX/DR IN RCRD: CPT | Mod: CPTII,S$GLB,, | Performed by: INTERNAL MEDICINE

## 2023-09-01 PROCEDURE — 99999 PR PBB SHADOW E&M-EST. PATIENT-LVL III: ICD-10-PCS | Mod: PBBFAC,,, | Performed by: INTERNAL MEDICINE

## 2023-09-01 PROCEDURE — 99999 PR PBB SHADOW E&M-EST. PATIENT-LVL III: CPT | Mod: PBBFAC,,, | Performed by: INTERNAL MEDICINE

## 2023-09-01 PROCEDURE — 3078F PR MOST RECENT DIASTOLIC BLOOD PRESSURE < 80 MM HG: ICD-10-PCS | Mod: CPTII,S$GLB,, | Performed by: INTERNAL MEDICINE

## 2023-09-01 PROCEDURE — 1160F PR REVIEW ALL MEDS BY PRESCRIBER/CLIN PHARMACIST DOCUMENTED: ICD-10-PCS | Mod: CPTII,S$GLB,, | Performed by: INTERNAL MEDICINE

## 2023-09-01 PROCEDURE — 3008F PR BODY MASS INDEX (BMI) DOCUMENTED: ICD-10-PCS | Mod: CPTII,S$GLB,, | Performed by: INTERNAL MEDICINE

## 2023-09-01 PROCEDURE — 3074F SYST BP LT 130 MM HG: CPT | Mod: CPTII,S$GLB,, | Performed by: INTERNAL MEDICINE

## 2023-09-01 PROCEDURE — 3074F PR MOST RECENT SYSTOLIC BLOOD PRESSURE < 130 MM HG: ICD-10-PCS | Mod: CPTII,S$GLB,, | Performed by: INTERNAL MEDICINE

## 2023-09-01 PROCEDURE — 3078F DIAST BP <80 MM HG: CPT | Mod: CPTII,S$GLB,, | Performed by: INTERNAL MEDICINE

## 2023-09-01 RX ORDER — TIZANIDINE 4 MG/1
4 TABLET ORAL NIGHTLY PRN
Qty: 10 TABLET | Refills: 0 | Status: SHIPPED | OUTPATIENT
Start: 2023-09-01 | End: 2023-09-11

## 2023-09-01 RX ORDER — METHYLPREDNISOLONE 4 MG/1
TABLET ORAL
Qty: 21 EACH | Refills: 0 | Status: SHIPPED | OUTPATIENT
Start: 2023-09-01 | End: 2023-09-22
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
## 2023-09-06 NOTE — TELEPHONE ENCOUNTER
I can offer an xray and Physical Therapy. We have a Physical Therapy program here specialized for back pain. May I refer you?

## 2023-09-07 ENCOUNTER — PATIENT OUTREACH (OUTPATIENT)
Dept: ADMINISTRATIVE | Facility: HOSPITAL | Age: 43
End: 2023-09-07
Payer: COMMERCIAL

## 2024-01-05 ENCOUNTER — OFFICE VISIT (OUTPATIENT)
Dept: FAMILY MEDICINE | Facility: CLINIC | Age: 44
End: 2024-01-05
Payer: COMMERCIAL

## 2024-01-05 VITALS
SYSTOLIC BLOOD PRESSURE: 128 MMHG | HEART RATE: 85 BPM | BODY MASS INDEX: 39.91 KG/M2 | WEIGHT: 263.31 LBS | HEIGHT: 68 IN | DIASTOLIC BLOOD PRESSURE: 80 MMHG | OXYGEN SATURATION: 97 %

## 2024-01-05 DIAGNOSIS — J30.89 ENVIRONMENTAL AND SEASONAL ALLERGIES: ICD-10-CM

## 2024-01-05 DIAGNOSIS — Z23 NEED FOR VACCINATION: ICD-10-CM

## 2024-01-05 DIAGNOSIS — Z00.00 ANNUAL PHYSICAL EXAM: Primary | ICD-10-CM

## 2024-01-05 DIAGNOSIS — E66.01 CLASS 3 SEVERE OBESITY DUE TO EXCESS CALORIES WITHOUT SERIOUS COMORBIDITY WITH BODY MASS INDEX (BMI) OF 40.0 TO 44.9 IN ADULT: ICD-10-CM

## 2024-01-05 PROBLEM — R07.1 CHEST PAIN ON BREATHING: Status: RESOLVED | Noted: 2020-01-07 | Resolved: 2024-01-05

## 2024-01-05 PROBLEM — E66.813 CLASS 3 SEVERE OBESITY DUE TO EXCESS CALORIES WITHOUT SERIOUS COMORBIDITY WITH BODY MASS INDEX (BMI) OF 40.0 TO 44.9 IN ADULT: Status: ACTIVE | Noted: 2021-12-03

## 2024-01-05 PROBLEM — R94.31 NONSPECIFIC ABNORMAL ELECTROCARDIOGRAM (ECG) (EKG): Status: RESOLVED | Noted: 2018-10-04 | Resolved: 2024-01-05

## 2024-01-05 PROCEDURE — G0008 ADMIN INFLUENZA VIRUS VAC: HCPCS | Mod: S$GLB,,, | Performed by: STUDENT IN AN ORGANIZED HEALTH CARE EDUCATION/TRAINING PROGRAM

## 2024-01-05 PROCEDURE — 3074F SYST BP LT 130 MM HG: CPT | Mod: CPTII,S$GLB,, | Performed by: STUDENT IN AN ORGANIZED HEALTH CARE EDUCATION/TRAINING PROGRAM

## 2024-01-05 PROCEDURE — 3079F DIAST BP 80-89 MM HG: CPT | Mod: CPTII,S$GLB,, | Performed by: STUDENT IN AN ORGANIZED HEALTH CARE EDUCATION/TRAINING PROGRAM

## 2024-01-05 PROCEDURE — 1159F MED LIST DOCD IN RCRD: CPT | Mod: CPTII,S$GLB,, | Performed by: STUDENT IN AN ORGANIZED HEALTH CARE EDUCATION/TRAINING PROGRAM

## 2024-01-05 PROCEDURE — 99999 PR PBB SHADOW E&M-EST. PATIENT-LVL IV: CPT | Mod: PBBFAC,,, | Performed by: STUDENT IN AN ORGANIZED HEALTH CARE EDUCATION/TRAINING PROGRAM

## 2024-01-05 PROCEDURE — 3008F BODY MASS INDEX DOCD: CPT | Mod: CPTII,S$GLB,, | Performed by: STUDENT IN AN ORGANIZED HEALTH CARE EDUCATION/TRAINING PROGRAM

## 2024-01-05 PROCEDURE — 90686 IIV4 VACC NO PRSV 0.5 ML IM: CPT | Mod: S$GLB,,, | Performed by: STUDENT IN AN ORGANIZED HEALTH CARE EDUCATION/TRAINING PROGRAM

## 2024-01-05 PROCEDURE — 99396 PREV VISIT EST AGE 40-64: CPT | Mod: S$GLB,,, | Performed by: STUDENT IN AN ORGANIZED HEALTH CARE EDUCATION/TRAINING PROGRAM

## 2024-01-05 NOTE — ASSESSMENT & PLAN NOTE
Patient recently restarted fluticasone nasal spray and he feels his allergies have improved. He has had testing in the past. Continue current regimen. Consider adding neti pot/saline rinses.

## 2024-01-05 NOTE — ASSESSMENT & PLAN NOTE
Patient notes that he previously had lost weight with the keto diet - he stopped the keto diet after his last visit to [place] and gained the weight back. He intends to restart the keto diet. We also discussed exercise - this may be difficult due to time restraints, responsibilities to his children, and prior injuries to his shoulders limiting strength training options. He does have a fair amount of muscle mass and he asked what a good weight goal might be. I advised he focus instead on waist circumference - goal is < 40 inches.

## 2024-01-05 NOTE — PROGRESS NOTES
Name: Stanley Bancroft Harvey Jr.  MRN: [id number]  : 1980    HPI  Patient presents to establish care.     Review of Systems   HENT:  Positive for sneezing.    Gastrointestinal:  Positive for abdominal pain (describes abdominal cramping if he has fatty or greasy foods) and diarrhea (if he has greasy foods).   Allergic/Immunologic: Positive for environmental allergies.        Patient Active Problem List   Diagnosis    Chronic right shoulder pain    Neck stiffness    Class 3 severe obesity due to excess calories without serious comorbidity with body mass index (BMI) of 40.0 to 44.9 in adult    HAILEY (obstructive sleep apnea)    Environmental and seasonal allergies       Current Outpatient Medications on File Prior to Visit   Medication Sig Dispense Refill    cetirizine (ZYRTEC) 10 MG tablet Take 10 mg by mouth once daily.      diphenhydrAMINE (BENADRYL) 25 mg capsule Take 25 mg by mouth every 6 (six) hours as needed for Itching.      loratadine (CLARITIN) 10 mg tablet Take 10 mg by mouth once daily.      pseudoephedrine-ibuprofen (ADVIL COLD AND SINUS)  mg Cap Take by mouth.       No current facility-administered medications on file prior to visit.       Past Medical History:   Diagnosis Date    Allergy     Migraines     Mumps     during childhood, severe    Overweight     Recurrent upper respiratory infection (URI)     Urticaria        Past Surgical History:   Procedure Laterality Date    CIRCUMCISION          Family History   Problem Relation Age of Onset    Asthma Mother     Allergies Mother     Diabetes Paternal Grandmother     Kidney failure Paternal Grandmother     Arthritis Maternal Grandmother         Bette    Cancer Paternal Grandfather     Nephrolithiasis Neg Hx     Glaucoma Neg Hx     Retinal detachment Neg Hx     Macular degeneration Neg Hx     Allergic rhinitis Neg Hx     Angioedema Neg Hx     Atopy Neg Hx     Eczema Neg Hx     Immunodeficiency Neg Hx     Rhinitis Neg Hx     Urticaria Neg Hx         Social History     Socioeconomic History    Marital status:    Tobacco Use    Smoking status: Former     Current packs/day: 0.00     Average packs/day: 1 pack/day for 15.0 years (15.0 ttl pk-yrs)     Types: Cigarettes     Start date: 2006     Quit date: 2017     Years since quittin.8    Smokeless tobacco: Former     Quit date: 2015    Tobacco comments:     Still vaping    Substance and Sexual Activity    Alcohol use: Yes     Alcohol/week: 3.0 standard drinks of alcohol     Types: 3 Drinks containing 0.5 oz of alcohol per week     Comment: social    Drug use: Never    Sexual activity: Yes     Partners: Female     Birth control/protection: None       Review of patient's allergies indicates:  No Known Allergies     Health Maintenance Due   Topic Date Due    Hepatitis C Screening  Never done    HIV Screening  Never done    COVID-19 Vaccine (3 - 2023-24 season) 2023       Vitals:    24 1453   BP: 128/80   Pulse:         Physical Exam  Constitutional:       General: He is not in acute distress.     Appearance: Normal appearance. He is well-developed.   HENT:      Head: Normocephalic and atraumatic.      Right Ear: External ear normal.      Left Ear: External ear normal.   Eyes:      Conjunctiva/sclera: Conjunctivae normal.   Cardiovascular:      Rate and Rhythm: Normal rate and regular rhythm.      Heart sounds: No murmur heard.     No friction rub. No gallop.   Pulmonary:      Effort: Pulmonary effort is normal. No respiratory distress.      Breath sounds: No wheezing, rhonchi or rales.   Abdominal:      General: Abdomen is flat. There is no distension.   Musculoskeletal:         General: No swelling or deformity.      Right lower leg: No edema.      Left lower leg: No edema.   Skin:     General: Skin is warm and dry.      Coloration: Skin is not jaundiced.   Neurological:      Mental Status: He is alert and oriented to person, place, and time. Mental status is at baseline.    Psychiatric:         Attention and Perception: Attention and perception normal.         Mood and Affect: Mood normal.         Speech: Speech normal.         Behavior: Behavior normal. Behavior is cooperative.         Thought Content: Thought content normal.         Cognition and Memory: Cognition normal.         Judgment: Judgment normal.          1. Annual physical exam    2. Need for vaccination  -     Influenza - Quadrivalent (PF)    3. Class 3 severe obesity due to excess calories without serious comorbidity with body mass index (BMI) of 40.0 to 44.9 in adult  Assessment & Plan:  Patient notes that he previously had lost weight with the keto diet - he stopped the keto diet after his last visit to [place] and gained the weight back. He intends to restart the keto diet. We also discussed exercise - this may be difficult due to time restraints, responsibilities to his children, and prior injuries to his shoulders limiting strength training options. He does have a fair amount of muscle mass and he asked what a good weight goal might be. I advised he focus instead on waist circumference - goal is < 40 inches.      4. Environmental and seasonal allergies  Assessment & Plan:  Patient recently restarted fluticasone nasal spray and he feels his allergies have improved. He has had testing in the past. Continue current regimen. Consider adding neti pot/saline rinses.          Follow up in 1 year. Patient had labs 4/2023 and I do not think they need repeating yet.    Amos Hernandez MD  01/05/2024

## 2024-08-27 ENCOUNTER — PATIENT MESSAGE (OUTPATIENT)
Dept: FAMILY MEDICINE | Facility: CLINIC | Age: 44
End: 2024-08-27
Payer: COMMERCIAL

## 2024-10-30 ENCOUNTER — PATIENT MESSAGE (OUTPATIENT)
Dept: RESEARCH | Facility: HOSPITAL | Age: 44
End: 2024-10-30
Payer: COMMERCIAL

## 2025-01-15 ENCOUNTER — HOSPITAL ENCOUNTER (EMERGENCY)
Facility: HOSPITAL | Age: 45
Discharge: HOME | End: 2025-01-15
Attending: EMERGENCY MEDICINE
Payer: COMMERCIAL

## 2025-01-15 VITALS
DIASTOLIC BLOOD PRESSURE: 84 MMHG | HEART RATE: 82 BPM | SYSTOLIC BLOOD PRESSURE: 140 MMHG | OXYGEN SATURATION: 98 % | BODY MASS INDEX: 40.16 KG/M2 | WEIGHT: 265 LBS | HEIGHT: 68 IN | TEMPERATURE: 99 F | RESPIRATION RATE: 18 BRPM

## 2025-01-15 DIAGNOSIS — S09.90XA CLOSED HEAD INJURY, INITIAL ENCOUNTER: Primary | ICD-10-CM

## 2025-01-15 PROCEDURE — 99285 EMERGENCY DEPT VISIT HI MDM: CPT | Mod: 25
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
## 2025-01-24 ENCOUNTER — TELEPHONE (OUTPATIENT)
Dept: FAMILY MEDICINE | Facility: CLINIC | Age: 45
End: 2025-01-24
Payer: COMMERCIAL

## 2025-01-24 ENCOUNTER — PATIENT MESSAGE (OUTPATIENT)
Dept: ADMINISTRATIVE | Facility: OTHER | Age: 45
End: 2025-01-24
Payer: COMMERCIAL

## 2025-01-24 NOTE — TELEPHONE ENCOUNTER
Called patient yesterday and today to reschedule appointment from weather. No answer, left voicemail both times.

## 2025-08-19 ENCOUNTER — PATIENT MESSAGE (OUTPATIENT)
Dept: ADMINISTRATIVE | Facility: HOSPITAL | Age: 45
End: 2025-08-19
Payer: COMMERCIAL